# Patient Record
Sex: MALE | Race: WHITE | HISPANIC OR LATINO | Employment: OTHER | URBAN - METROPOLITAN AREA
[De-identification: names, ages, dates, MRNs, and addresses within clinical notes are randomized per-mention and may not be internally consistent; named-entity substitution may affect disease eponyms.]

---

## 2018-09-19 ENCOUNTER — TELEPHONE (OUTPATIENT)
Dept: UROLOGY | Facility: CLINIC | Age: 68
End: 2018-09-19

## 2018-09-19 NOTE — TELEPHONE ENCOUNTER
I called patient to reschedule 10/19/18 with Karan in [facility]  Patient was very upset stating he's been waiting since since August for an appt  Patient would like sooner appt in [place] only

## 2019-01-29 ENCOUNTER — TELEPHONE (OUTPATIENT)
Dept: NEPHROLOGY | Facility: CLINIC | Age: 69
End: 2019-01-29

## 2019-01-29 NOTE — TELEPHONE ENCOUNTER
Sonia Riley called from [facility] stating that the patient is Transferring his care from [facility] to [facility]  Sonia Riley was calling to see if the patient has called to set up an appointment with us to establish, but as of 1/29/2019 patient still has not called for an appointment  Sonia Riley said that the patient's voice mailbox is full and was not able to leave a message  Sonia Riley said that she will keep trying to call the patient to transfer over his care for follow up

## 2019-01-30 ENCOUNTER — HOSPITAL ENCOUNTER (INPATIENT)
Facility: HOSPITAL | Age: 69
LOS: 7 days | Discharge: HOME | DRG: 673 | End: 2019-02-07
Attending: EMERGENCY MEDICINE | Admitting: INTERNAL MEDICINE
Payer: COMMERCIAL

## 2019-01-30 DIAGNOSIS — N17.9 ACUTE RENAL FAILURE SUPERIMPOSED ON CHRONIC KIDNEY DISEASE (HCC): ICD-10-CM

## 2019-01-30 DIAGNOSIS — N18.9 ACUTE RENAL FAILURE SUPERIMPOSED ON CHRONIC KIDNEY DISEASE (HCC): ICD-10-CM

## 2019-01-30 DIAGNOSIS — J81.1 PULMONARY EDEMA: Primary | ICD-10-CM

## 2019-01-30 DIAGNOSIS — I50.9 ACUTE ON CHRONIC CONGESTIVE HEART FAILURE, UNSPECIFIED HEART FAILURE TYPE (HCC): ICD-10-CM

## 2019-01-30 PROCEDURE — 99285 EMERGENCY DEPT VISIT HI MDM: CPT

## 2019-01-30 PROCEDURE — 93005 ELECTROCARDIOGRAM TRACING: CPT

## 2019-01-31 ENCOUNTER — APPOINTMENT (EMERGENCY)
Dept: RADIOLOGY | Facility: HOSPITAL | Age: 69
DRG: 673 | End: 2019-01-31
Payer: COMMERCIAL

## 2019-01-31 ENCOUNTER — APPOINTMENT (INPATIENT)
Dept: NON INVASIVE DIAGNOSTICS | Facility: HOSPITAL | Age: 69
DRG: 673 | End: 2019-01-31
Payer: COMMERCIAL

## 2019-01-31 ENCOUNTER — APPOINTMENT (INPATIENT)
Dept: INTERVENTIONAL RADIOLOGY/VASCULAR | Facility: HOSPITAL | Age: 69
DRG: 673 | End: 2019-01-31
Payer: COMMERCIAL

## 2019-01-31 PROBLEM — E11.65 TYPE 2 DIABETES MELLITUS WITH HYPERGLYCEMIA (HCC): Status: ACTIVE | Noted: 2019-01-31

## 2019-01-31 PROBLEM — I50.9 CONGESTIVE HEART FAILURE (CHF) (HCC): Status: ACTIVE | Noted: 2019-01-31

## 2019-01-31 PROBLEM — E78.49 OTHER HYPERLIPIDEMIA: Status: ACTIVE | Noted: 2019-01-31

## 2019-01-31 PROBLEM — N18.9 ACUTE RENAL FAILURE SUPERIMPOSED ON CHRONIC KIDNEY DISEASE (HCC): Status: ACTIVE | Noted: 2019-01-31

## 2019-01-31 PROBLEM — N17.9 ACUTE RENAL FAILURE SUPERIMPOSED ON CHRONIC KIDNEY DISEASE (HCC): Status: ACTIVE | Noted: 2019-01-31

## 2019-01-31 PROBLEM — I10 ESSENTIAL HYPERTENSION: Status: ACTIVE | Noted: 2019-01-31

## 2019-01-31 LAB
25(OH)D3 SERPL-MCNC: 39.2 NG/ML (ref 30–100)
ALBUMIN SERPL BCP-MCNC: 3.2 G/DL (ref 3.5–5)
ALBUMIN SERPL BCP-MCNC: 3.5 G/DL (ref 3.5–5)
ALP SERPL-CCNC: 91 U/L (ref 46–116)
ALP SERPL-CCNC: 98 U/L (ref 46–116)
ALT SERPL W P-5'-P-CCNC: 10 U/L (ref 12–78)
ALT SERPL W P-5'-P-CCNC: 12 U/L (ref 12–78)
ANION GAP BLD CALC-SCNC: 15 MMOL/L (ref 4–13)
ANION GAP SERPL CALCULATED.3IONS-SCNC: 13 MMOL/L (ref 4–13)
ANION GAP SERPL CALCULATED.3IONS-SCNC: 14 MMOL/L (ref 4–13)
APTT PPP: 32 SECONDS (ref 26–38)
AST SERPL W P-5'-P-CCNC: 11 U/L (ref 5–45)
AST SERPL W P-5'-P-CCNC: 9 U/L (ref 5–45)
ATRIAL RATE: 116 BPM
BASOPHILS # BLD AUTO: 0.08 THOUSANDS/ΜL (ref 0–0.1)
BASOPHILS # BLD AUTO: 0.09 THOUSANDS/ΜL (ref 0–0.1)
BASOPHILS NFR BLD AUTO: 1 % (ref 0–1)
BASOPHILS NFR BLD AUTO: 1 % (ref 0–1)
BILIRUB SERPL-MCNC: 0.2 MG/DL (ref 0.2–1)
BILIRUB SERPL-MCNC: 0.3 MG/DL (ref 0.2–1)
BUN BLD-MCNC: 55 MG/DL (ref 5–25)
BUN SERPL-MCNC: 58 MG/DL (ref 5–25)
BUN SERPL-MCNC: 59 MG/DL (ref 5–25)
CA-I BLD-SCNC: 1.12 MMOL/L (ref 1.12–1.32)
CA-I BLD-SCNC: 1.13 MMOL/L (ref 1.12–1.32)
CALCIUM SERPL-MCNC: 8.3 MG/DL (ref 8.3–10.1)
CALCIUM SERPL-MCNC: 8.6 MG/DL (ref 8.3–10.1)
CHLORIDE BLD-SCNC: 107 MMOL/L (ref 100–108)
CHLORIDE SERPL-SCNC: 102 MMOL/L (ref 100–108)
CHLORIDE SERPL-SCNC: 103 MMOL/L (ref 100–108)
CO2 SERPL-SCNC: 22 MMOL/L (ref 21–32)
CO2 SERPL-SCNC: 22 MMOL/L (ref 21–32)
CREAT BLD-MCNC: 5.4 MG/DL (ref 0.6–1.3)
CREAT SERPL-MCNC: 5.5 MG/DL (ref 0.6–1.3)
CREAT SERPL-MCNC: 5.5 MG/DL (ref 0.6–1.3)
EOSINOPHIL # BLD AUTO: 0.6 THOUSAND/ΜL (ref 0–0.61)
EOSINOPHIL # BLD AUTO: 0.76 THOUSAND/ΜL (ref 0–0.61)
EOSINOPHIL NFR BLD AUTO: 5 % (ref 0–6)
EOSINOPHIL NFR BLD AUTO: 6 % (ref 0–6)
ERYTHROCYTE [DISTWIDTH] IN BLOOD BY AUTOMATED COUNT: 13.8 % (ref 11.6–15.1)
ERYTHROCYTE [DISTWIDTH] IN BLOOD BY AUTOMATED COUNT: 13.8 % (ref 11.6–15.1)
EST. AVERAGE GLUCOSE BLD GHB EST-MCNC: 200 MG/DL
GFR SERPL CREATININE-BSD FRML MDRD: 10 ML/MIN/1.73SQ M
GLUCOSE SERPL-MCNC: 137 MG/DL (ref 65–140)
GLUCOSE SERPL-MCNC: 283 MG/DL (ref 65–140)
GLUCOSE SERPL-MCNC: 300 MG/DL (ref 65–140)
GLUCOSE SERPL-MCNC: 301 MG/DL (ref 65–140)
GLUCOSE SERPL-MCNC: 368 MG/DL (ref 65–140)
GLUCOSE SERPL-MCNC: 370 MG/DL (ref 65–140)
HBA1C MFR BLD: 8.6 % (ref 4.2–6.3)
HCT VFR BLD AUTO: 33.5 % (ref 36.5–49.3)
HCT VFR BLD AUTO: 36.7 % (ref 36.5–49.3)
HCT VFR BLD CALC: 35 % (ref 36.5–49.3)
HGB BLD-MCNC: 10.4 G/DL (ref 12–17)
HGB BLD-MCNC: 11.4 G/DL (ref 12–17)
HGB BLDA-MCNC: 11.9 G/DL (ref 12–17)
IMM GRANULOCYTES # BLD AUTO: 0.06 THOUSAND/UL (ref 0–0.2)
IMM GRANULOCYTES # BLD AUTO: 0.09 THOUSAND/UL (ref 0–0.2)
IMM GRANULOCYTES NFR BLD AUTO: 1 % (ref 0–2)
IMM GRANULOCYTES NFR BLD AUTO: 1 % (ref 0–2)
INR PPP: 1.04 (ref 0.86–1.17)
INR PPP: 1.12 (ref 0.86–1.17)
LYMPHOCYTES # BLD AUTO: 2.12 THOUSANDS/ΜL (ref 0.6–4.47)
LYMPHOCYTES # BLD AUTO: 2.3 THOUSANDS/ΜL (ref 0.6–4.47)
LYMPHOCYTES NFR BLD AUTO: 17 % (ref 14–44)
LYMPHOCYTES NFR BLD AUTO: 19 % (ref 14–44)
MAGNESIUM SERPL-MCNC: 1.7 MG/DL (ref 1.6–2.6)
MCH RBC QN AUTO: 28 PG (ref 26.8–34.3)
MCH RBC QN AUTO: 28.3 PG (ref 26.8–34.3)
MCHC RBC AUTO-ENTMCNC: 31 G/DL (ref 31.4–37.4)
MCHC RBC AUTO-ENTMCNC: 31.1 G/DL (ref 31.4–37.4)
MCV RBC AUTO: 90 FL (ref 82–98)
MCV RBC AUTO: 91 FL (ref 82–98)
MONOCYTES # BLD AUTO: 0.86 THOUSAND/ΜL (ref 0.17–1.22)
MONOCYTES # BLD AUTO: 0.92 THOUSAND/ΜL (ref 0.17–1.22)
MONOCYTES NFR BLD AUTO: 7 % (ref 4–12)
MONOCYTES NFR BLD AUTO: 8 % (ref 4–12)
NEUTROPHILS # BLD AUTO: 7.82 THOUSANDS/ΜL (ref 1.85–7.62)
NEUTROPHILS # BLD AUTO: 8.48 THOUSANDS/ΜL (ref 1.85–7.62)
NEUTS SEG NFR BLD AUTO: 66 % (ref 43–75)
NEUTS SEG NFR BLD AUTO: 68 % (ref 43–75)
NRBC BLD AUTO-RTO: 0 /100 WBCS
NRBC BLD AUTO-RTO: 0 /100 WBCS
NT-PROBNP SERPL-MCNC: 805 PG/ML
P AXIS: 62 DEGREES
PCO2 BLD: 23 MMOL/L (ref 21–32)
PHOSPHATE SERPL-MCNC: 4.5 MG/DL (ref 2.3–4.1)
PHOSPHATE SERPL-MCNC: 4.8 MG/DL (ref 2.3–4.1)
PLATELET # BLD AUTO: 265 THOUSANDS/UL (ref 149–390)
PLATELET # BLD AUTO: 272 THOUSANDS/UL (ref 149–390)
PLATELET # BLD AUTO: 301 THOUSANDS/UL (ref 149–390)
PMV BLD AUTO: 10.5 FL (ref 8.9–12.7)
PMV BLD AUTO: 10.5 FL (ref 8.9–12.7)
PMV BLD AUTO: 10.6 FL (ref 8.9–12.7)
POTASSIUM BLD-SCNC: 4.6 MMOL/L (ref 3.5–5.3)
POTASSIUM SERPL-SCNC: 4.5 MMOL/L (ref 3.5–5.3)
POTASSIUM SERPL-SCNC: 4.7 MMOL/L (ref 3.5–5.3)
PR INTERVAL: 156 MS
PROT SERPL-MCNC: 7.3 G/DL (ref 6.4–8.2)
PROT SERPL-MCNC: 7.8 G/DL (ref 6.4–8.2)
PROTHROMBIN TIME: 13.6 SECONDS (ref 11.8–14.2)
PROTHROMBIN TIME: 14.3 SECONDS (ref 11.8–14.2)
PTH-INTACT SERPL-MCNC: 350.7 PG/ML (ref 18.4–80.1)
QRS AXIS: 38 DEGREES
QRSD INTERVAL: 72 MS
QT INTERVAL: 318 MS
QTC INTERVAL: 442 MS
RBC # BLD AUTO: 3.68 MILLION/UL (ref 3.88–5.62)
RBC # BLD AUTO: 4.07 MILLION/UL (ref 3.88–5.62)
SODIUM BLD-SCNC: 138 MMOL/L (ref 136–145)
SODIUM SERPL-SCNC: 138 MMOL/L (ref 136–145)
SODIUM SERPL-SCNC: 138 MMOL/L (ref 136–145)
SPECIMEN SOURCE: ABNORMAL
T WAVE AXIS: 56 DEGREES
TROPONIN I SERPL-MCNC: 0.03 NG/ML
TROPONIN I SERPL-MCNC: 0.14 NG/ML
TROPONIN I SERPL-MCNC: 0.2 NG/ML
TSH SERPL DL<=0.05 MIU/L-ACNC: 1.04 UIU/ML (ref 0.36–3.74)
VENTRICULAR RATE: 116 BPM
WBC # BLD AUTO: 11.92 THOUSAND/UL (ref 4.31–10.16)
WBC # BLD AUTO: 12.26 THOUSAND/UL (ref 4.31–10.16)

## 2019-01-31 PROCEDURE — 77001 FLUOROGUIDE FOR VEIN DEVICE: CPT | Performed by: RADIOLOGY

## 2019-01-31 PROCEDURE — 93010 ELECTROCARDIOGRAM REPORT: CPT | Performed by: INTERNAL MEDICINE

## 2019-01-31 PROCEDURE — 85025 COMPLETE CBC W/AUTO DIFF WBC: CPT | Performed by: PHYSICIAN ASSISTANT

## 2019-01-31 PROCEDURE — 87340 HEPATITIS B SURFACE AG IA: CPT | Performed by: INTERNAL MEDICINE

## 2019-01-31 PROCEDURE — 82330 ASSAY OF CALCIUM: CPT | Performed by: INTERNAL MEDICINE

## 2019-01-31 PROCEDURE — 86704 HEP B CORE ANTIBODY TOTAL: CPT | Performed by: INTERNAL MEDICINE

## 2019-01-31 PROCEDURE — 99152 MOD SED SAME PHYS/QHP 5/>YRS: CPT | Performed by: RADIOLOGY

## 2019-01-31 PROCEDURE — 85025 COMPLETE CBC W/AUTO DIFF WBC: CPT | Performed by: EMERGENCY MEDICINE

## 2019-01-31 PROCEDURE — 85014 HEMATOCRIT: CPT

## 2019-01-31 PROCEDURE — 99222 1ST HOSP IP/OBS MODERATE 55: CPT | Performed by: INTERNAL MEDICINE

## 2019-01-31 PROCEDURE — C1750 CATH, HEMODIALYSIS,LONG-TERM: HCPCS

## 2019-01-31 PROCEDURE — 86803 HEPATITIS C AB TEST: CPT | Performed by: INTERNAL MEDICINE

## 2019-01-31 PROCEDURE — 82306 VITAMIN D 25 HYDROXY: CPT | Performed by: INTERNAL MEDICINE

## 2019-01-31 PROCEDURE — 93306 TTE W/DOPPLER COMPLETE: CPT | Performed by: INTERNAL MEDICINE

## 2019-01-31 PROCEDURE — 83735 ASSAY OF MAGNESIUM: CPT | Performed by: PHYSICIAN ASSISTANT

## 2019-01-31 PROCEDURE — 02H633Z INSERTION OF INFUSION DEVICE INTO RIGHT ATRIUM, PERCUTANEOUS APPROACH: ICD-10-PCS | Performed by: RADIOLOGY

## 2019-01-31 PROCEDURE — 84484 ASSAY OF TROPONIN QUANT: CPT | Performed by: EMERGENCY MEDICINE

## 2019-01-31 PROCEDURE — 36558 INSERT TUNNELED CV CATH: CPT | Performed by: RADIOLOGY

## 2019-01-31 PROCEDURE — 84443 ASSAY THYROID STIM HORMONE: CPT | Performed by: EMERGENCY MEDICINE

## 2019-01-31 PROCEDURE — 93306 TTE W/DOPPLER COMPLETE: CPT

## 2019-01-31 PROCEDURE — 85730 THROMBOPLASTIN TIME PARTIAL: CPT | Performed by: PHYSICIAN ASSISTANT

## 2019-01-31 PROCEDURE — 83880 ASSAY OF NATRIURETIC PEPTIDE: CPT | Performed by: EMERGENCY MEDICINE

## 2019-01-31 PROCEDURE — 86706 HEP B SURFACE ANTIBODY: CPT | Performed by: INTERNAL MEDICINE

## 2019-01-31 PROCEDURE — 96374 THER/PROPH/DIAG INJ IV PUSH: CPT

## 2019-01-31 PROCEDURE — 80053 COMPREHEN METABOLIC PANEL: CPT | Performed by: EMERGENCY MEDICINE

## 2019-01-31 PROCEDURE — 82948 REAGENT STRIP/BLOOD GLUCOSE: CPT

## 2019-01-31 PROCEDURE — 85610 PROTHROMBIN TIME: CPT | Performed by: EMERGENCY MEDICINE

## 2019-01-31 PROCEDURE — 0JH63XZ INSERTION OF TUNNELED VASCULAR ACCESS DEVICE INTO CHEST SUBCUTANEOUS TISSUE AND FASCIA, PERCUTANEOUS APPROACH: ICD-10-PCS | Performed by: RADIOLOGY

## 2019-01-31 PROCEDURE — 85610 PROTHROMBIN TIME: CPT | Performed by: PHYSICIAN ASSISTANT

## 2019-01-31 PROCEDURE — 36415 COLL VENOUS BLD VENIPUNCTURE: CPT | Performed by: EMERGENCY MEDICINE

## 2019-01-31 PROCEDURE — 76937 US GUIDE VASCULAR ACCESS: CPT | Performed by: RADIOLOGY

## 2019-01-31 PROCEDURE — 84100 ASSAY OF PHOSPHORUS: CPT | Performed by: PHYSICIAN ASSISTANT

## 2019-01-31 PROCEDURE — 84100 ASSAY OF PHOSPHORUS: CPT | Performed by: INTERNAL MEDICINE

## 2019-01-31 PROCEDURE — 84484 ASSAY OF TROPONIN QUANT: CPT | Performed by: PHYSICIAN ASSISTANT

## 2019-01-31 PROCEDURE — 80047 BASIC METABLC PNL IONIZED CA: CPT

## 2019-01-31 PROCEDURE — 83036 HEMOGLOBIN GLYCOSYLATED A1C: CPT | Performed by: PHYSICIAN ASSISTANT

## 2019-01-31 PROCEDURE — 80053 COMPREHEN METABOLIC PANEL: CPT | Performed by: PHYSICIAN ASSISTANT

## 2019-01-31 PROCEDURE — 99152 MOD SED SAME PHYS/QHP 5/>YRS: CPT

## 2019-01-31 PROCEDURE — 99153 MOD SED SAME PHYS/QHP EA: CPT

## 2019-01-31 PROCEDURE — 76937 US GUIDE VASCULAR ACCESS: CPT

## 2019-01-31 PROCEDURE — 77001 FLUOROGUIDE FOR VEIN DEVICE: CPT

## 2019-01-31 PROCEDURE — 85049 AUTOMATED PLATELET COUNT: CPT | Performed by: PHYSICIAN ASSISTANT

## 2019-01-31 PROCEDURE — 71045 X-RAY EXAM CHEST 1 VIEW: CPT

## 2019-01-31 PROCEDURE — 83970 ASSAY OF PARATHORMONE: CPT | Performed by: INTERNAL MEDICINE

## 2019-01-31 PROCEDURE — 36558 INSERT TUNNELED CV CATH: CPT

## 2019-01-31 RX ORDER — ONDANSETRON 2 MG/ML
4 INJECTION INTRAMUSCULAR; INTRAVENOUS EVERY 8 HOURS PRN
Status: DISCONTINUED | OUTPATIENT
Start: 2019-01-31 | End: 2019-02-07 | Stop reason: HOSPADM

## 2019-01-31 RX ORDER — FUROSEMIDE 20 MG/1
20 TABLET ORAL DAILY
COMMUNITY
End: 2019-07-17 | Stop reason: SDUPTHER

## 2019-01-31 RX ORDER — CALCITRIOL 0.25 UG/1
0.25 CAPSULE, LIQUID FILLED ORAL DAILY
Status: DISCONTINUED | OUTPATIENT
Start: 2019-01-31 | End: 2019-02-01

## 2019-01-31 RX ORDER — CALCITRIOL 0.25 UG/1
0.25 CAPSULE, LIQUID FILLED ORAL DAILY
COMMUNITY

## 2019-01-31 RX ORDER — MIDAZOLAM HYDROCHLORIDE 1 MG/ML
INJECTION INTRAMUSCULAR; INTRAVENOUS CODE/TRAUMA/SEDATION MEDICATION
Status: COMPLETED | OUTPATIENT
Start: 2019-01-31 | End: 2019-01-31

## 2019-01-31 RX ORDER — VARENICLINE TARTRATE 1 MG/1
1 TABLET, FILM COATED ORAL 2 TIMES DAILY
COMMUNITY

## 2019-01-31 RX ORDER — HEPARIN SODIUM 5000 [USP'U]/ML
5000 INJECTION, SOLUTION INTRAVENOUS; SUBCUTANEOUS EVERY 8 HOURS SCHEDULED
Status: DISCONTINUED | OUTPATIENT
Start: 2019-01-31 | End: 2019-02-07 | Stop reason: HOSPADM

## 2019-01-31 RX ORDER — INSULIN GLARGINE 100 [IU]/ML
40 INJECTION, SOLUTION SUBCUTANEOUS
Status: DISCONTINUED | OUTPATIENT
Start: 2019-01-31 | End: 2019-02-07 | Stop reason: HOSPADM

## 2019-01-31 RX ORDER — FUROSEMIDE 10 MG/ML
80 INJECTION INTRAMUSCULAR; INTRAVENOUS
Status: DISCONTINUED | OUTPATIENT
Start: 2019-01-31 | End: 2019-02-04

## 2019-01-31 RX ORDER — FENTANYL CITRATE 50 UG/ML
INJECTION, SOLUTION INTRAMUSCULAR; INTRAVENOUS CODE/TRAUMA/SEDATION MEDICATION
Status: COMPLETED | OUTPATIENT
Start: 2019-01-31 | End: 2019-01-31

## 2019-01-31 RX ORDER — AMLODIPINE BESYLATE 10 MG/1
10 TABLET ORAL DAILY
Status: DISCONTINUED | OUTPATIENT
Start: 2019-01-31 | End: 2019-02-07 | Stop reason: HOSPADM

## 2019-01-31 RX ORDER — VARENICLINE TARTRATE 0.5 MG/1
1 TABLET, FILM COATED ORAL 2 TIMES DAILY
Status: DISCONTINUED | OUTPATIENT
Start: 2019-01-31 | End: 2019-02-07 | Stop reason: HOSPADM

## 2019-01-31 RX ORDER — AMLODIPINE BESYLATE 10 MG/1
10 TABLET ORAL DAILY
COMMUNITY
End: 2019-07-17 | Stop reason: SDUPTHER

## 2019-01-31 RX ORDER — FUROSEMIDE 20 MG/1
20 TABLET ORAL DAILY
Status: DISCONTINUED | OUTPATIENT
Start: 2019-01-31 | End: 2019-01-31

## 2019-01-31 RX ORDER — INSULIN GLARGINE 100 [IU]/ML
40 INJECTION, SOLUTION SUBCUTANEOUS
COMMUNITY

## 2019-01-31 RX ORDER — FUROSEMIDE 10 MG/ML
40 INJECTION INTRAMUSCULAR; INTRAVENOUS ONCE
Status: COMPLETED | OUTPATIENT
Start: 2019-01-31 | End: 2019-01-31

## 2019-01-31 RX ORDER — LIDOCAINE HYDROCHLORIDE AND EPINEPHRINE 10; 10 MG/ML; UG/ML
INJECTION, SOLUTION INFILTRATION; PERINEURAL CODE/TRAUMA/SEDATION MEDICATION
Status: COMPLETED | OUTPATIENT
Start: 2019-01-31 | End: 2019-01-31

## 2019-01-31 RX ADMIN — MIDAZOLAM HYDROCHLORIDE 1 MG: 1 INJECTION, SOLUTION INTRAMUSCULAR; INTRAVENOUS at 19:07

## 2019-01-31 RX ADMIN — INSULIN LISPRO 8 UNITS: 100 INJECTION, SOLUTION INTRAVENOUS; SUBCUTANEOUS at 21:45

## 2019-01-31 RX ADMIN — VARENICLINE TARTRATE 1 MG: 1 TABLET, FILM COATED ORAL at 08:26

## 2019-01-31 RX ADMIN — AMLODIPINE BESYLATE 10 MG: 10 TABLET ORAL at 08:26

## 2019-01-31 RX ADMIN — MIDAZOLAM HYDROCHLORIDE 1 MG: 1 INJECTION, SOLUTION INTRAMUSCULAR; INTRAVENOUS at 18:51

## 2019-01-31 RX ADMIN — LIDOCAINE HYDROCHLORIDE,EPINEPHRINE BITARTRATE 10 ML: 10; .01 INJECTION, SOLUTION INFILTRATION; PERINEURAL at 19:06

## 2019-01-31 RX ADMIN — FUROSEMIDE 20 MG: 20 TABLET ORAL at 08:26

## 2019-01-31 RX ADMIN — HEPARIN SODIUM 5000 UNITS: 5000 INJECTION, SOLUTION INTRAVENOUS; SUBCUTANEOUS at 21:44

## 2019-01-31 RX ADMIN — FENTANYL CITRATE 50 MCG: 50 INJECTION, SOLUTION INTRAMUSCULAR; INTRAVENOUS at 18:51

## 2019-01-31 RX ADMIN — LIDOCAINE HYDROCHLORIDE,EPINEPHRINE BITARTRATE 1 ML: 10; .01 INJECTION, SOLUTION INFILTRATION; PERINEURAL at 19:11

## 2019-01-31 RX ADMIN — FUROSEMIDE 80 MG: 10 INJECTION, SOLUTION INTRAMUSCULAR; INTRAVENOUS at 17:55

## 2019-01-31 RX ADMIN — FUROSEMIDE 80 MG: 10 INJECTION, SOLUTION INTRAMUSCULAR; INTRAVENOUS at 10:48

## 2019-01-31 RX ADMIN — INSULIN LISPRO 20 UNITS: 100 INJECTION, SOLUTION INTRAVENOUS; SUBCUTANEOUS at 08:26

## 2019-01-31 RX ADMIN — HEPARIN SODIUM 5000 UNITS: 5000 INJECTION, SOLUTION INTRAVENOUS; SUBCUTANEOUS at 06:00

## 2019-01-31 RX ADMIN — CALCIUM ACETATE 667 MG: 667 CAPSULE ORAL at 08:26

## 2019-01-31 RX ADMIN — FUROSEMIDE 40 MG: 10 INJECTION, SOLUTION INTRAVENOUS at 00:40

## 2019-01-31 RX ADMIN — CALCITRIOL CAPSULES 0.25 MCG 0.25 MCG: 0.25 CAPSULE ORAL at 08:25

## 2019-01-31 RX ADMIN — INSULIN LISPRO 6 UNITS: 100 INJECTION, SOLUTION INTRAVENOUS; SUBCUTANEOUS at 08:26

## 2019-01-31 RX ADMIN — INSULIN GLARGINE 40 UNITS: 100 INJECTION, SOLUTION SUBCUTANEOUS at 21:44

## 2019-01-31 RX ADMIN — LIDOCAINE HYDROCHLORIDE,EPINEPHRINE BITARTRATE 5 ML: 10; .01 INJECTION, SOLUTION INFILTRATION; PERINEURAL at 19:04

## 2019-01-31 NOTE — ASSESSMENT & PLAN NOTE
-presented to the emergency room with shortness of breath  -Creatinine at 5 50  -unclear what his baseline is no prior documentation available  -he does have a history of chronic kidney disease  unknown stage  -he reports that he was evaluated to start  dialysis  -he is on Lasix 20 mg p o   Daily  -he did received Lasix 40 mg in emergency room  -avoid nephrotoxic agents  -nephrology consult

## 2019-01-31 NOTE — CONSULTS
NEPHROLOGY CONSULTATION NOTE    Patient: Gio Martines               Sex: male          DOA: 1/30/2019 11:45 PM   YOB: 1950        Age:  70 y o         LOS:  LOS: 0 days     REFERRING PHYSICIAN: Dr Gillian Mendez / Morgan Trujillows:  CKD stage 5    DATE OF CONSULTATION / SERVICE: 1/31/2019    ADMISSION DIAGNOSIS: Acute renal failure superimposed on chronic kidney disease ([facility] [address] )     CHIEF COMPLAINT     Shortness of breath    HPI     This is a 59-year-old male with past medical history of CHF with diastolic dysfunction, hypertension, diabetes mellitus, obesity, renal cancer status post nephrectomy in 2002, chronic kidney disease stage 5 who presented to the ER with complains of shortness of breath  Patient was followed by [facility] specialist until recently  Since patient and family live near [place] dialysis unit they requested transfer to [address] [place] [address] assessed to be established with the [facility]  Patient was scheduled to see me next week however presented to the hospital complains of shortness of breath  On chest x-ray patient was noted to have vascular congestion  Patient was given diuretics with adequate response  Patient does have uremic symptoms including excessive sleepiness and fatigue  Patient had a AV fistula created 2 weeks ago for the very purpose of being used for dialysis           PAST MEDICAL HISTORY     Past Medical History:   Diagnosis Date    Cancer [facility])     kidney     [facility]congest[facility])     [facility]     Hyperlipidemia     Hypertension     Renal disorder        PAST SURGICAL HISTORY     Past Surgical History:   Procedure Laterality Date    NEPHRECTOMY Right     PLACEMENT PROTHESIS PENILE         ALLERGIES     No Known Allergies    SOCIAL HISTORY     History   Alcohol Use No     History   Drug Use No     History   Smoking Status    Former Smoker   Smokeless Tobacco    Never Used FAMILY HISTORY     History reviewed  No pertinent family history  CURRENT MEDICATIONS       Current Facility-Administered Medications:     amLODIPine (NORVASC) tablet 10 mg, 10 mg, Oral, Daily, Greg Fuchs PA-C, 10 mg at 01/31/19 0826    calcitriol (ROCALTROL) capsule 0 25 mcg, 0 25 mcg, Oral, Daily, Greg Fuchs PA-C, 0 25 mcg at 01/31/19 0825    calcium acetate (PHOSLO) capsule 667 mg, 667 mg, Oral, BID, Greg Fuchs PA-C, 667 mg at 01/31/19 0826    furosemide (LASIX) injection 80 mg, 80 mg, Intravenous, BID (diuretic), Jennifersa Fernando PA-C, 80 mg at 01/31/19 1048    heparin (porcine) subcutaneous injection 5,000 Units, 5,000 Units, Subcutaneous, Q8H [address], Stopped at 01/31/19 1400 **AND** Platelet count, , , Once, Greg Fuchs PA-C    insulin glargine (LANTUS) subcutaneous injection 40 Units 0 4 mL, 40 Units, Subcutaneous, HS, Greg Fuchs PA-C    insulin lispro (HumaLOG) 100 units/mL subcutaneous injection 2-12 Units, 2-12 Units, Subcutaneous, TID AC, 6 Units at 01/31/19 0826 **AND** Fingerstick Glucose (POCT), , , TID AC, Greg Fuchs PA-C    insulin lispro (HumaLOG) 100 units/mL subcutaneous injection 2-12 Units, 2-12 Units, Subcutaneous, HS, Greg Fuchs PA-C    insulin lispro (HumaLOG) 100 units/mL subcutaneous injection 20 Units, 20 Units, Subcutaneous, TID With Meals, Greg Fuchs PA-C, 20 Units at 01/31/19 0826    ondansetron (ZOFRAN) injection 4 mg, 4 mg, Intravenous, Q8H PRN, Greg Fuchs PA-C    varenicline (CHANTIX) tablet 1 mg, 1 mg, Oral, BID, Greg Fuchs PA-C, 1 mg at 01/31/19 4660    REVIEW OF SYSTEMS     Review of Systems   Constitutional: Positive for appetite change and fatigue  HENT: Negative  Eyes: Negative  Respiratory: Positive for shortness of breath  Cardiovascular: Positive for leg swelling  Gastrointestinal: Negative  Endocrine: Negative  Genitourinary: Negative  Musculoskeletal: Negative  Skin: Negative  Allergic/Immunologic: Negative  Neurological: Negative  Hematological: Negative  All other systems reviewed and are negative  OBJECTIVE     Current Weight: Weight - Scale: 114 kg (251 lb 5 2 oz)  Vitals:    01/31/19 1156   BP: 141/70   Pulse: 92   Resp: 20   Temp: 97 9 °F (36 6 °C)   SpO2: 96%     Body mass index is 39 36 kg/m²  Intake/Output Summary (Last 24 hours) at 01/31/19 1252  Last data filed at 01/31/19 3353   Gross per 24 hour   Intake              730 ml   Output             1380 ml   Net             -650 ml       PHYSICAL EXAMINATION     Physical Exam   Constitutional: He is oriented to person, place, and time  He appears well-developed and well-nourished  HENT:   Head: Normocephalic and atraumatic  Eyes: Pupils are equal, round, and reactive to light  Neck: Neck supple  Cardiovascular: Normal rate, regular rhythm and normal heart sounds  Pulmonary/Chest: Effort normal  He has rales  Abdominal: Soft  Bowel sounds are normal    Musculoskeletal: Normal range of motion  He exhibits edema  Neurological: He is alert and oriented to person, place, and time  Skin: Skin is warm  Psychiatric: He has a normal mood and affect           LAB RESULTS          Results from last 7 days  Lab Units 01/31/19  0557 01/31/19  0319 01/31/19  0010 01/31/19  0004   WBC Thousand/uL 12 26*  --   --  11 92*   HEMOGLOBIN g/dL 10 4*  --   --  11 4*   I STAT HEMOGLOBIN g/dl  --   --  11 9*  --    HEMATOCRIT % 33 5*  --   --  36 7   HEMATOCRIT, ISTAT %  --   --  35*  --    PLATELETS Thousands/uL 272 265  --  301   POTASSIUM mmol/L 4 7  --   --  4 5   CHLORIDE mmol/L 103  --   --  102   CO2 mmol/L 22  --   --  22   CO2, I-STAT mmol/L  --   --  23  --    BUN mg/dL 59*  --   --  58*   CREATININE mg/dL 5 50*  --   --  5 50*   EGFR ml/min/1 73sq m 10  --  10 10   CALCIUM mg/dL 8 3  --   --  8 6   MAGNESIUM mg/dL 1 7  --   --   --    PHOSPHORUS mg/dL 4 8*  --   --   --    GLUCOSE, ISTAT mg/dl  --   --  368*  --        I have personally reviewed the old medical records and patient's previously known baseline creatinine level is ~ 5 5    RADIOLOGY RESULTS     Results for orders placed during the hospital encounter of 01/30/19   XR chest 1 view portable    Narrative CHEST     INDICATION:   heart failure  COMPARISON:  None    EXAM PERFORMED/VIEWS:  XR CHEST PORTABLE      FINDINGS:    Cardiac silhouette is enlarged  Distended central pulmonary vasculature  No airspace consolidation, pneumothorax, or large pleural effusion  Osseous structures appear within normal limits for patient age  Impression Cardiomegaly and central pulmonary vascular congestion  Findings concur with the preliminary report by the referring clinician already in PACS and/or our electronic record EPIC  Workstation performed: [id number]       No results found for this or any previous visit  No results found for this or any previous visit  No results found for this or any previous visit  No results found for this or any previous visit  No results found for this or any previous visit  PLAN / RECOMMENDATIONS      21-year-old male with past medical history of nephrectomy 2nd to renal cancer in 2002, CKD stage 5, hypertension, diabetes mellitus type 2, obesity who presented with shortness of breath as found to be in volume overload  1  ESRD:  Patient has uremic symptoms including fatigue, excessive sleepiness and decreased appetite  X-ray finding of vascular congestion is likely related to progression of CKD as well  Patient and family are agreeable to start hemodialysis while in the hospital   Suspicion breakfast this morning, will have tunnel cuffed catheter placement later today and start renal replacement therapy from tomorrow  Pros and cons of dialysis told  Patient family are interested in home dialysis  Referral will be made as to the [facility]  Will obtain hepatitis profile as well      2  Shortness of breath:  Likely due to volume overload from progressive kidney disease  We will keep on Lasix 80 mg twice a day  3  Bone mineral disorder:  Will check PTH intact, 25 hydroxy vitamin-D, calcium and phosphorus levels  Patient is noted to be on calcitriol and calcium acetate as well  4  Anemia of chronic disease:  Hemoglobin is 10 4 in acceptable range will perform iron studies  5  Diabetes mellitus:  Patient is on standing Lantus as well as sliding scale  6  Access:  Patient has a left radiocephalic fistula which is not mature yet  Therefore will place a tunnel cuffed catheter in this patient  Thank you for the consultation to participate in patient's care  I have personally discussed my plan with the referring physician       Roger Adkins MD    1/31/2019

## 2019-01-31 NOTE — ED PROVIDER NOTES
History  Chief Complaint   Patient presents with    Shortness of Breath     SOB and CP for the past couple of days, pt is to begin dialysis but has not had tx yet      50-year-old male with known end-stage renal disease status post nephrectomy and kidney failure of his only remaining native kidney presents to the emergency department for evaluation of hypoxemia and work of breathing  The patient has been progressively short of breath for the past several days until today when he was in such distress he decided to come to the emergency department for evaluation  Currently the patient is working to breathe, speaking in 5-7 word sentences, is hypoxemic with O2 sat of 88% and no history of home oxygen  The patient did have a graft fistula placed to the left wrist which has a palpable thrill and is well-appearing for future use on dialysis  The patient has mild tenting of his T-waves in V2 three in for a Chem eight will be done to assess for hyperkalemia  The patient's underlying rhythm is a sinus induced tachycardia with ventricular rate of 116 on EKG reviewed by me at bedside at 2353 hours  There is no obvious ischemia there are frequent premature atrial contractions  Patient will be evaluated with a differential diagnosis to include but not be limited to worsening renal insufficiency and renal failure, congestive heart failure, hyperkalemia  History provided by:  Patient   used: No    Shortness of Breath   Severity:  Moderate  Onset quality:  Gradual  Timing:  Constant  Progression:  Worsening  Chronicity:  New  Context: activity    Relieved by:  Nothing  Worsened by: Activity, deep breathing, exertion and movement  Associated symptoms: no chest pain, no cough, no diaphoresis, no headaches, no neck pain, no rash and no vomiting    Risk factors: no hx of cancer        Prior to Admission Medications   Prescriptions Last Dose Informant Patient Reported? Taking?    amLODIPine (NORVASC) 10 mg tablet   Yes Yes   Sig: Take 10 mg by mouth daily   calcitriol (ROCALTROL) 0 25 mcg capsule   Yes Yes   Sig: Take 0 25 mcg by mouth daily   calcium acetate (PHOSLO) 667 mg capsule   Yes Yes   Sig: Take 667 mg by mouth 2 (two) times a day   furosemide (LASIX) 20 mg tablet   Yes Yes   Sig: Take 20 mg by mouth daily   insulin aspart (NovoLOG) 100 units/mL injection   Yes Yes   Sig: Inject 20 Units under the skin 3 (three) times a day before meals   insulin glargine (LANTUS) 100 units/mL subcutaneous injection   Yes Yes   Sig: Inject 40 Units under the skin daily at bedtime   varenicline (CHANTIX) 1 mg tablet   Yes Yes   Sig: Take 1 mg by mouth 2 (two) times a day      Facility-Administered Medications: None       Past Medical History:   Diagnosis Date    Cancer ([facility] )     kidney     [facility]congest[facility] failure) ([facility] )     Diabetes mellitus ([place] [address] )     Hyperlipidemia     Hypertension     Renal disorder        Past Surgical History:   Procedure Laterality Date    NEPHRECTOMY Right     PLACEMENT PROTHESIS PENILE         History reviewed  No pertinent family history  I have reviewed and agree with the history as documented  Social History   Substance Use Topics    Smoking status: Former Smoker    Smokeless tobacco: Never Used    Alcohol use No        Review of Systems   Constitutional: Negative for diaphoresis  Respiratory: Positive for shortness of breath  Negative for cough  Cardiovascular: Negative for chest pain  Gastrointestinal: Negative for vomiting  Musculoskeletal: Negative for neck pain  Skin: Negative for rash  Neurological: Negative for headaches  All other systems reviewed and are negative  Physical Exam  Physical Exam   Constitutional: He is oriented to person, place, and time  He appears well-developed and well-nourished  No distress  HENT:   Head: Normocephalic and atraumatic     Right Ear: External ear normal    Left Ear: External ear normal    Eyes: Conjunctivae and EOM are normal  Right eye exhibits no discharge  Left eye exhibits no discharge  No scleral icterus  Neck: Normal range of motion  Neck supple  No JVD present  No tracheal deviation present  No thyromegaly present  Cardiovascular: Normal rate and regular rhythm  Pulmonary/Chest: Effort normal and breath sounds normal  No stridor  No respiratory distress  He has no wheezes  He has no rales  Abdominal: Soft  Bowel sounds are normal  He exhibits no distension  There is no tenderness  Musculoskeletal: Normal range of motion  He exhibits no edema, tenderness or deformity  Neurological: He is alert and oriented to person, place, and time  No cranial nerve deficit  Coordination normal    Skin: Skin is warm and dry  He is not diaphoretic  Psychiatric: He has a normal mood and affect  His behavior is normal    Nursing note and vitals reviewed  Vital Signs  ED Triage Vitals [01/31/19 0009]   Temperature Pulse Respirations Blood Pressure SpO2   98 °F (36 7 °C) (!) 121 20 160/81 92 %      Temp Source Heart Rate Source Patient Position - Orthostatic VS BP Location FiO2 (%)   Oral Monitor Lying Right arm --      Pain Score       Worst Possible Pain           Vitals:    01/31/19 0009 01/31/19 0100   BP: 160/81 154/84   Pulse: (!) 121 (!) 117   Patient Position - Orthostatic VS: Lying Sitting       Visual Acuity      ED Medications  Medications   furosemide (LASIX) injection 40 mg (40 mg Intravenous Given 1/31/19 0040)       Diagnostic Studies  Results Reviewed     Procedure Component Value Units Date/Time    TSH, 3rd generation with Free T4 reflex [[id number]]  (Normal) Collected:  01/31/19 0004    Lab Status:  Final result Specimen:  Blood from Arm, Right Updated:  01/31/19 0033     TSH 3RD GENERATON 1 038 uIU/mL     Narrative:         Patients undergoing fluorescein dye angiography may retain small amounts of fluorescein in the body for 48-72 hours post procedure   Samples containing fluorescein can produce falsely depressed TSH values  If the patient had this procedure,a specimen should be resubmitted post fluorescein clearance  NT-BNP PRO (BNP for [place], [place], [place], [place], [place], [place], [place], [place] campuses) [[id number]]  (Abnormal) Collected:  01/31/19 0004    Lab Status:  Final result Specimen:  Blood from Arm, Right Updated:  01/31/19 0033     NT-proBNP 805 (H) pg/mL     Troponin I [[id number]]  (Normal) Collected:  01/31/19 0004    Lab Status:  Final result Specimen:  Blood from Arm, Right Updated:  01/31/19 0030     Troponin I 0 03 ng/mL     Comprehensive metabolic panel [[id number]]  (Abnormal) Collected:  01/31/19 0004    Lab Status:  Final result Specimen:  Blood from Arm, Right Updated:  01/31/19 0025     Sodium 138 mmol/L      Potassium 4 5 mmol/L      Chloride 102 mmol/L      CO2 22 mmol/L      ANION GAP 14 (H) mmol/L      BUN 58 (H) mg/dL      Creatinine 5 50 (H) mg/dL      Glucose 370 (H) mg/dL      Calcium 8 6 mg/dL      AST 11 U/L      ALT 12 U/L      Alkaline Phosphatase 98 U/L      Total Protein 7 8 g/dL      Albumin 3 5 g/dL      Total Bilirubin 0 20 mg/dL      eGFR 10 ml/min/1 73sq m     Narrative:         National Kidney Disease Education Program recommendations are as follows:  GFR calculation is accurate only with a steady state creatinine  Chronic Kidney disease less than 60 ml/min/1 73 sq  meters  Kidney failure less than 15 ml/min/1 73 sq  meters      Protime-INR [[id number]]  (Normal) Collected:  01/31/19 0004    Lab Status:  Final result Specimen:  Blood from Arm, Right Updated:  01/31/19 0020     Protime 13 6 seconds      INR 1 04    POCT Chem 8+ [[id number]]  (Abnormal) Collected:  01/31/19 0010    Lab Status:  Final result Updated:  01/31/19 0014     SODIUM, I-STAT 138 mmol/l      Potassium, i-STAT 4 6 mmol/L      Chloride, istat 107 mmol/L      CO2, i-STAT 23 mmol/L      Anion Gap, i-STAT 15 (H) mmol/L      Calcium, Ionized i-STAT 1 12 mmol/L      BUN, I-STAT 55 (H) mg/dl      Creatinine, i-STAT 5 4 (H) mg/dl eGFR 10 ml/min/1 73sq m      Glucose, i-STAT 368 (H) mg/dl      Hct, i-STAT 35 (L) %      Hgb, i-STAT 11 9 (L) g/dl      Specimen Type VENOUS    CBC and differential [[id number]]  (Abnormal) Collected:  01/31/19 0004    Lab Status:  Final result Specimen:  Blood from Arm, Right Updated:  01/31/19 0009     WBC 11 92 (H) Thousand/uL      RBC 4 07 Million/uL      Hemoglobin 11 4 (L) g/dL      Hematocrit 36 7 %      MCV 90 fL      MCH 28 0 pg      MCHC 31 1 (L) g/dL      RDW 13 8 %      MPV 10 5 fL      Platelets 439 Thousands/uL      nRBC 0 /100 WBCs      Neutrophils Relative 66 %      Immat GRANS % 1 %      Lymphocytes Relative 19 %      Monocytes Relative 7 %      Eosinophils Relative 6 %      Basophils Relative 1 %      Neutrophils Absolute 7 82 (H) Thousands/µL      Immature Grans Absolute 0 09 Thousand/uL      Lymphocytes Absolute 2 30 Thousands/µL      Monocytes Absolute 0 86 Thousand/µL      Eosinophils Absolute 0 76 (H) Thousand/µL      Basophils Absolute 0 09 Thousands/µL     Troponin I [[id number]]     Lab Status:  No result Specimen:  Blood                  XR chest 1 view portable   ED Interpretation by Dianne Garcia DO (01/31 0026)   Pulmonary edema                 Procedures  Procedures       Phone Contacts  ED Phone Contact    ED Course           Identification of Seniors at Risk      Most Recent Value   (ISAR) Identification of Seniors at Risk   Before the illness or injury that brought you to the Emergency, did you need someone to help you on a regular basis? 0 Filed at: 01/31/2019 0012   In the last 24 hours, have you needed more help than usual?  0 Filed at: 01/31/2019 2103   Have you been hospitalized for one or more nights during the past 6 months? 1 Filed at: 01/31/2019 0012   In general, do you see well?  0 Filed at: 01/31/2019 0012   In general, do you have serious problems with your memory? 0 Filed at: 01/31/2019 0012   Do you take more than three different medications every day?   1 Filed at: 01/31/2019 0012   ISAR Score  2 Filed at: 01/31/2019 0012                          [facility]  Number of Diagnoses or Management Options  Pulmonary edema: new and requires workup     Amount and/or Complexity of Data Reviewed  Clinical lab tests: reviewed and ordered  Tests in the radiology section of CPT®: ordered and reviewed  Decide to obtain previous medical records or to obtain history from someone other than the patient: yes  Review and summarize past medical records: yes    Patient Progress  Patient progress: stable      Disposition  Final diagnoses:   Pulmonary edema     Time reflects when diagnosis was documented in both MDM as applicable and the Disposition within this note     Time User Action Codes Description Comment    1/31/2019  1:04 AM Avery Chen Add [J81 1] Pulmonary edema       ED Disposition     ED Disposition Condition Date/Time Comment    Admit  Thu Jan 31, 2019  1:04 AM Case was discussed with Kj Raza and the patient's admission status was agreed to be Admission Status: inpatient status to the service of Dr Jose Garcia   Follow-up Information    None         Patient's Medications   Discharge Prescriptions    No medications on file     No discharge procedures on file      ED Provider  Electronically Signed by           Fawn Bay DO  01/31/19 0120

## 2019-01-31 NOTE — H&P
[facility] - [facility] [employer] Internal Medicine  H&P- Alycia Rivera 1950, 70 y o  male MRN: [id number]    Unit/Bed#: MS Aguirre-01 Encounter: [id number]    Primary Care Provider: Janene Ferreira MD   Date and time admitted to hospital: 1/30/2019 11:45 PM        * Acute renal failure superimposed on chronic kidney disease ([facility] )   Assessment & Plan    -presented to the emergency room with shortness of breath  -Creatinine at 5 50  -unclear what his baseline is no prior documentation available  -he does have a history of chronic kidney disease  unknown stage  -he reports that he was evaluated to start  dialysis  -he is on Lasix 20 mg p o  Daily  -he did received Lasix 40 mg in emergency room  -avoid nephrotoxic agents  -nephrology consult     Acute on chronic congestive heart failure ([facility] 75 )   Assessment & Plan    -Presented to the emergency room with shortness of breath , and chest pain Which he states is worse upon minimal exertion  -probably secondary to worsening CHF  -he has bilateral lower extremity edema  -chest x-ray completed official report pending  -proBNP at 805  -received Lasix 40 mg in the emergency room  -trend troponin x 3 sets  -AM labs  -check echo  -cardiology consult  -supportive care     Type 2 diabetes mellitus with hyperglycemia (HCC)   Assessment & Plan    Glucose level at 370  -continue Lantus 40 units subcu HS  -continue Humalog 20 units subcu t i d  With meals  -sliding scale insulin coverage  -fingerstick glucose 4 times daily AC/HS  -check A1C  -AM CMP     Essential hypertension   Assessment & Plan    -Blood pressure is stable  -continue home medication     Other hyperlipidemia   Assessment & Plan    -Not currently on medication regimen  -check lipid profile  -encouraged PCP follow-up             VTE Prophylaxis: Heparin  / sequential compression device   Code Status: Level 1- Full code  POLST: POLST form is not discussed and not completed at this time    Discussion with family: None    Anticipated Length of Stay:  Patient will be admitted on an Inpatient basis with an anticipated length of stay of  > 2 midnights  Justification for Hospital Stay:  Acute kidney injury, acute on chronic CHF,    Total Time for Visit, including Counseling / Coordination of Care: 30 minutes  Greater than 50% of this total time spent on direct patient counseling and coordination of care  Chief Complaint:   Shortness of breath    History of Present Illness:    Xochitl Terrazas is a 70 y o  male who presents with complaints of shortness of breath over the past several days getting progressively worse  Shortness of breath is associated with chest pain  He states that symptoms of worse with minimal exertion  He also states that he becomes very tired even with minimal exertion  He does have history of chronic kidney disease with possibility of beginning dialysis  He states that he has fistula placed but has not started dialysis  He also has history of congestive congestive heart failure  He denies fever, chills, nausea, diarrhea, vomiting, abdominal pain  Labs completed in emergency room with results as shown below  Chest x-ray completed with official report pending  He received Lasix 40 mg IV in emergency room  At bedside he states that he feels little bit better chest pain has subsided but he still feels a bit short of breath  Review of Systems:    Review of Systems   Constitutional: Positive for activity change and fatigue  Negative for chills and fever  HENT: Negative for sinus pain, sinus pressure, sore throat and trouble swallowing  Eyes: Negative for photophobia, redness and visual disturbance  Respiratory: Positive for chest tightness and shortness of breath  Negative for wheezing  Cardiovascular: Positive for chest pain and leg swelling  Negative for palpitations  Gastrointestinal: Negative for abdominal pain, diarrhea, nausea and vomiting  Endocrine: Negative for polydipsia, polyphagia and polyuria  Genitourinary: Negative for difficulty urinating, dysuria, flank pain and hematuria  Musculoskeletal: Negative for arthralgias, back pain, gait problem and joint swelling  Skin: Negative for color change, pallor and rash  Allergic/Immunologic: Negative for food allergies and immunocompromised state  Neurological: Negative for dizziness, tremors, facial asymmetry, weakness and light-headedness  Hematological: Does not bruise/bleed easily  Psychiatric/Behavioral: Negative for agitation, confusion and sleep disturbance  The patient is not nervous/anxious  Past Medical and Surgical History:     Past Medical History:   Diagnosis Date    Cancer ([facility] )     kidney     [facility]     Diabetes mellitus ([place] [address] )     Hyperlipidemia     Hypertension     Renal disorder        Past Surgical History:   Procedure Laterality Date    NEPHRECTOMY Right     PLACEMENT PROTHESIS PENILE         Meds/Allergies:    Prior to Admission medications    Medication Sig Start Date End Date Taking? Authorizing Provider   amLODIPine (NORVASC) 10 mg tablet Take 10 mg by mouth daily   Yes Historical Provider, MD   calcitriol (ROCALTROL) 0 25 mcg capsule Take 0 25 mcg by mouth daily   Yes Historical Provider, MD   calcium acetate (PHOSLO) 667 mg capsule Take 667 mg by mouth 2 (two) times a day   Yes Historical Provider, MD   furosemide (LASIX) 20 mg tablet Take 20 mg by mouth daily   Yes Historical Provider, MD   insulin aspart (NovoLOG) 100 units/mL injection Inject 20 Units under the skin 3 (three) times a day before meals   Yes Historical Provider, MD   insulin glargine (LANTUS) 100 units/mL subcutaneous injection Inject 40 Units under the skin daily at bedtime   Yes Historical Provider, MD   varenicline (CHANTIX) 1 mg tablet Take 1 mg by mouth 2 (two) times a day   Yes Historical Provider, MD     I have reviewed home medications with patient personally      Allergies: No Known Allergies    Social History:     Marital Status:    Occupation:  Retired  Patient Pre-hospital Living Situation:  Lives with roommate  Patient Pre-hospital Level of Mobility:  Active  Patient Pre-hospital Diet Restrictions:  None reported  Substance Use History:   History   Alcohol Use No     History   Smoking Status    Former Smoker   Smokeless Tobacco    Never Used     History   Drug Use No       Family History:    History reviewed  No pertinent family history  Physical Exam:     Vitals:   Blood Pressure: 147/65 (01/31/19 0153)  Pulse: 105 (01/31/19 0153)  Temperature: 98 3 °F (36 8 °C) (01/31/19 0153)  Temp Source: Oral (01/31/19 0153)  Respirations: 22 (01/31/19 0153)  Height: 5' 7" (170 2 cm) (01/31/19 0153)  Weight - Scale: 114 kg (252 lb 3 3 oz) (01/31/19 0153)  SpO2: 96 % (01/31/19 0153)    Physical Exam   Constitutional: He is oriented to person, place, and time  Mild respiratory distress   HENT:   Head: Normocephalic and atraumatic  Mouth/Throat: Oropharynx is clear and moist    Eyes: Pupils are equal, round, and reactive to light  EOM are normal  Right eye exhibits no discharge  Left eye exhibits discharge  Neck: Normal range of motion  Neck supple  No JVD present  Cardiovascular: Regular rhythm and intact distal pulses  Tachycardic   Pulmonary/Chest: No stridor  He is in respiratory distress  He has no wheezes  He has no rales  Abdominal: Soft  Bowel sounds are normal  He exhibits no distension  There is no tenderness  There is no rebound  Musculoskeletal: Normal range of motion  He exhibits edema  He exhibits no tenderness  Bilateral lower extremity edema   Neurological: He is oriented to person, place, and time  Skin: Skin is warm and dry  No rash noted  He is not diaphoretic  No erythema  No pallor  Vitals reviewed  Additional Data:     Lab Results: I have personally reviewed pertinent reports          Results from last 7 days  Lab Units 01/31/19  0319 01/31/19  0010 01/31/19  0004 WBC Thousand/uL  --   --  11 92*   HEMOGLOBIN g/dL  --   --  11 4*   I STAT HEMOGLOBIN g/dl  --  11 9*  --    HEMATOCRIT %  --   --  36 7   HEMATOCRIT, ISTAT %  --  35*  --    PLATELETS Thousands/uL 265  --  301   NEUTROS PCT %  --   --  66   LYMPHS PCT %  --   --  19   MONOS PCT %  --   --  7   EOS PCT %  --   --  6       Results from last 7 days  Lab Units 01/31/19  0010 01/31/19  0004   SODIUM mmol/L  --  138   POTASSIUM mmol/L  --  4 5   CHLORIDE mmol/L  --  102   CO2 mmol/L  --  22   CO2, I-STAT mmol/L 23  --    BUN mg/dL  --  58*   CREATININE mg/dL  --  5 50*   AGAP mmol/L 15*  --    ANION GAP mmol/L  --  14*   CALCIUM mg/dL  --  8 6   ALBUMIN g/dL  --  3 5   TOTAL BILIRUBIN mg/dL  --  0 20   ALK PHOS U/L  --  98   ALT U/L  --  12   AST U/L  --  11   GLUCOSE RANDOM mg/dL  --  370*       Results from last 7 days  Lab Units 01/31/19  0004   INR  1 04                   Imaging: I have personally reviewed pertinent reports  XR chest 1 view portable   ED Interpretation by Jaclyn Murdock DO (01/31 0026)   Pulmonary edema          EKG, Pathology, and Other Studies Reviewed on Admission:   · EKG: Sinus tachycardia at rate of 116 bpm    Allscripts / Epic Records Reviewed: Yes     ** Please Note: This note has been constructed using a voice recognition system   **

## 2019-01-31 NOTE — ASSESSMENT & PLAN NOTE
Glucose level at 370  -continue Lantus 40 units subcu HS  -continue Humalog 20 units subcu t i d   With meals  -sliding scale insulin coverage  -fingerstick glucose 4 times daily AC/HS  -check A1C  -AM CMP

## 2019-01-31 NOTE — CONSULTS
Consultation - Cardiology Team One  [place] Piero 70 y o  male MRN: [id number]  Unit/Bed#: -01 Encounter: [id number]    Inpatient consult to Cardiology  Consult performed by: Keith Hylton ordered by: Gabi Orr          Physician Requesting Consult: Ralf Richardson MD  Reason for Consult / Principal Problem: chf    HPI: Cardiologist Dr Solis Kena is a 70y o  year old male who has a history of kidney cancer status post nephrectomy, CHF, diabetes, hypertension, hyperlipidemia, CKD, aortic stenosis, mitral valve stenosis  Patient came to the emergency with complaints of shortness of breath for the last couple of days at progressively getting worse  Patient states he would get short of breath with minimal exertion  He states he was tired with even minimal exertion  He has no personal history of CAD  Patient has known chronic kidney disease and has a fistula placement has not started on dialysis, he thinks it is not very far away  Patient states he is feeling much better since being in the hospital   Patient's primary cardiologist is Dr Seymour Nation from [address] [place] [address] [address]         REVIEW OF SYSTEMS:  Constitutional:  Denies fever or chills   Eyes:  Denies change in visual acuity   HENT:  Denies nasal congestion or sore throat   Respiratory:  +shortness of breath  Cardiovascular:  Denies chest pain or edema   GI:  Denies abdominal pain, nausea, vomiting, bloody stools or diarrhea   :  Denies dysuria, frequency, difficulty in micturition and nocturia  Musculoskeletal:  Denies back pain or joint pain   Neurologic:  Denies headache, focal weakness or sensory changes   Endocrine:  Denies polyuria or polydipsia   Lymphatic:  Denies swollen glands   Psychiatric:  Denies depression or anxiety     Historical Information   Past Medical History:   Diagnosis Date    Cancer ([facility] )     kidney     CHF (congestive heart failure) ([facility] )     Diabetes mellitus ([facility] )     Hyperlipidemia     Hypertension     Renal disorder      Past Surgical History:   Procedure Laterality Date    NEPHRECTOMY Right     PLACEMENT PROTHESIS PENILE       History   Alcohol Use No     History   Drug Use No     History   Smoking Status    Former Smoker   Smokeless Tobacco    Never Used       Family History: History reviewed  No pertinent family history  MEDS & ALLERGIES:  all current active meds have been reviewed and current meds: Current Facility-Administered Medications   Medication Dose Route Frequency    amLODIPine (NORVASC) tablet 10 mg  10 mg Oral Daily    calcitriol (ROCALTROL) capsule 0 25 mcg  0 25 mcg Oral Daily    calcium acetate (PHOSLO) capsule 667 mg  667 mg Oral BID    furosemide (LASIX) injection 80 mg  80 mg Intravenous BID (diuretic)    heparin (porcine) subcutaneous injection 5,000 Units  5,000 Units Subcutaneous Q8H [facility]    insulin glargine (LANTUS) subcutaneous injection 40 Units 0 4 mL  40 Units Subcutaneous HS    insulin lispro (HumaLOG) 100 units/mL subcutaneous injection 2-12 Units  2-12 Units Subcutaneous TID AC    insulin lispro (HumaLOG) 100 units/mL subcutaneous injection 2-12 Units  2-12 Units Subcutaneous HS    insulin lispro (HumaLOG) 100 units/mL subcutaneous injection 20 Units  20 Units Subcutaneous TID With Meals    ondansetron (ZOFRAN) injection 4 mg  4 mg Intravenous Q8H PRN    varenicline (CHANTIX) tablet 1 mg  1 mg Oral BID        No Known Allergies    OBJECTIVE:  Vitals:   Vitals:    01/31/19 1156   BP: 141/70   Pulse: 92   Resp: 20   Temp: 97 9 °F (36 6 °C)   SpO2: 96%     Body mass index is 39 36 kg/m²      Systolic (12CDI), SAX:696 , Min:141 , IOI:606     Diastolic (35RVC), LII:17, Min:65, Max:84      Intake/Output Summary (Last 24 hours) at 01/31/19 1330  Last data filed at 01/31/19 0923   Gross per 24 hour   Intake              730 ml   Output             1380 ml   Net             -650 ml     Weight (last 2 days)     Date/Time   Weight    01/31/19 0557  114 (251 32)    01/31/19 0153  114 (252 21)    01/31/19 0009  116 (033 62)            Invasive Devices     Peripheral Intravenous Line            Peripheral IV 01/30/19 Right Antecubital less than 1 day                PHYSICAL EXAMS:  General:  Patient is not in acute distress, laying in the bed comfortably, awake, alert responding to commands  Head: Normocephalic, Atraumatic  HEENT: White sclera, pink conjunctiva,  PERRLA,pharynx benign  Neck:  Supple, no neck vein distention, carotids+2/+2 no bruits, thyromegaly, adenopathy  Respiratory: decreased breath sounds  Cardiovascular:  PMI normal, S1-S2 normal, No  Murmurs, thrills, gallops, rubs   Regular rhythm  GI:  Abdomen soft nontender   No hepatosplenomegaly, adenopathy, ascites,or rebound tenderness  Extremities: +1 edema, normal pulses, no calf tenderness, no joint deformities, no venous disease   Integument:  No skin rashes or ulceration  Lymphatic:  No cervical or inguinal lymphadenopathy  Neurologic:  Patient is awake alert, responding to command, well-oriented to time and place and person moving all extremities    LABORATORY RESULTS:    Results from last 7 days  Lab Units 01/31/19  0614 01/31/19  0319 01/31/19  0004   TROPONIN I ng/mL 0 20* 0 14* 0 03     CBC with diff:   Results from last 7 days  Lab Units 01/31/19  0557 01/31/19  0319 01/31/19  0010 01/31/19  0004   WBC Thousand/uL 12 26*  --   --  11 92*   HEMOGLOBIN g/dL 10 4*  --   --  11 4*   I STAT HEMOGLOBIN g/dl  --   --  11 9*  --    HEMATOCRIT % 33 5*  --   --  36 7   HEMATOCRIT, ISTAT %  --   --  35*  --    MCV fL 91  --   --  90   PLATELETS Thousands/uL 272 265  --  301   MCH pg 28 3  --   --  28 0   MCHC g/dL 31 0*  --   --  31 1*   RDW % 13 8  --   --  13 8   MPV fL 10 6 10 5  --  10 5   NRBC AUTO /100 WBCs 0  --   --  0       CMP:  Results from last 7 days  Lab Units 01/31/19  0557 01/31/19  0010 01/31/19  0004   POTASSIUM mmol/L 4 7  --  4 5   CHLORIDE mmol/L 103  --  102   CO2 mmol/L 22  --  22   CO2, I-STAT mmol/L  --  23 --    BUN mg/dL 59*  --  58*   CREATININE mg/dL 5 50*  --  5 50*   GLUCOSE, ISTAT mg/dl  --  368*  --    CALCIUM mg/dL 8 3  --  8 6   AST U/L 9  --  11   ALT U/L 10*  --  12   ALK PHOS U/L 91  --  98   EGFR ml/min/1 73sq m 10 10 10       BMP:  Results from last 7 days  Lab Units 19  0557 19  0010 19  0004   POTASSIUM mmol/L 4 7  --  4 5   CHLORIDE mmol/L 103  --  102   CO2 mmol/L 22  --  22   CO2, I-STAT mmol/L  --  23  --    BUN mg/dL 59*  --  58*   CREATININE mg/dL 5 50*  --  5 50*   GLUCOSE, ISTAT mg/dl  --  368*  --    CALCIUM mg/dL 8 3  --  8 6         Results from last 7 days  Lab Units 19  0004   NT-PRO BNP pg/mL 805*        Results from last 7 days  Lab Units 19  0557   MAGNESIUM mg/dL 1 7       Results from last 7 days  Lab Units 19  0557   HEMOGLOBIN A1C % 8 6*       Results from last 7 days  Lab Units 19  0004   TSH 3RD GENERATON uIU/mL 1 038       Results from last 7 days  Lab Units 19  0557 19  0004   INR  1 12 1 04       Lipid Profile:   No results found for: CHOL  No results found for: HDL  No results found for: LDLCALC  No results found for: TRIG    Cardiac testing:   Results for orders placed during the hospital encounter of 19   Echo complete with contrast if indicated    Narrative [address] [place] [address] [place] [id number] [phone]    Transthoracic Echocardiogram  2D, M-mode, Doppler, and Color Doppler    Study date:  2019    Patient: Francia Gamble  MR number: [id number]  Account number: [de-identified]  : 1950  Age: 70 years  Gender: Male  Status: Inpatient  Location: Bedside  Height: 67 in  Weight: 251 lb  BP: 153/ 72 mmHg    Indications: Shortness of breath      Diagnoses: R06 00 - Dyspnea, unspecified    Sonographer:  Denys Mart RDCS  Referring Physician:  Alondra Huertas MD  Group:  [facility]  Interpreting Physician:  Gloria Briceno, DO    IMPRESSIONS:  Normal left ventricular size and systolic function, ejection fraction 60%  Normal right ventricular size and systolic function  Biatrial dilation  Normal aorta  Pericardial fat pat with small, echo-light, free flowing effusion without tamponade  Moderate/severe MAC with moderately calcified mitral leaflets, moderate mitral stenosis (mean gradient 8-9 mmHg) and mild regurgitation  Severlely calcified aortic valve with moderate/severe aortic valve stenosis (3 8 m/s, mean gradient 32 mmHg)  No other significant valve abnormalities  Normal IVC diameter and inspiratory collapse  RA pressure estimated at 3 mmHg  No evidence of pulmonary hypertension  No prior studies available for comparison  SUMMARY    LEFT VENTRICLE:  Systolic function was normal by visual assessment  Ejection fraction was estimated to be 65 %  There were no regional wall motion abnormalities  LEFT ATRIUM:  The atrium was dilated  RIGHT ATRIUM:  The atrium was dilated  MITRAL VALVE:  There was moderate to marked annular calcification  There was moderate calcification  Transmitral velocity was increased due to valvular stenosis  There was moderate stenosis  There was mild regurgitation  Regurgitation grade was 1+ on a scale of 0 to 4+  AORTIC VALVE:  Leaflets exhibited markedly increased thickness, moderate to marked calcification, moderately reduced cuspal separation, and sclerosis  Transaortic velocity was increased due to valvular stenosis  There was moderate to severe stenosis  There was trace regurgitation  PERICARDIUM:  A small, free-flowing pericardial effusion was identified circumferential to the heart  The fluid had no internal echoes  There was no evidence of hemodynamic compromise  COMPARISONS:  No prior echocardiograms available for comparison  HISTORY: PRIOR HISTORY: hypertension, hyperlipidemia, CHF, CKD, SOB, fatigue    PROCEDURE: The procedure was performed at the bedside  This was a routine study  The transthoracic approach was used  The study included complete 2D imaging, M-mode, limited spectral Doppler, and color Doppler  The heart rate was 93 bpm,  at the start of the study  Images were obtained from the parasternal, apical, subcostal, and suprasternal notch acoustic windows  Image quality was adequate  LEFT VENTRICLE: Size was normal  Systolic function was normal by visual assessment  Ejection fraction was estimated to be 65 %  There were no regional wall motion abnormalities  DOPPLER: The study was not technically sufficient to allow  evaluation of LV diastolic function  RIGHT VENTRICLE: The size was normal  Systolic function was normal  DOPPLER: The tricuspid jet envelope definition was inadequate for estimation of RV systolic pressure  There are no indirect findings (abnormal RV volume or geometry,  altered pulmonary flow velocity profile, or leftward septal displacement) which would suggest moderate or severe pulmonary hypertension  LEFT ATRIUM: The atrium was dilated  RIGHT ATRIUM: The atrium was dilated  MITRAL VALVE: There was moderate to marked annular calcification  There was moderate calcification  There was mildly reduced leaflet separation  DOPPLER: Transmitral velocity was increased due to valvular stenosis  There was moderate  stenosis  There was mild regurgitation  Regurgitation grade was 1+ on a scale of 0 to 4+  Diastolic regurgitation was absent  AORTIC VALVE: Leaflets exhibited markedly increased thickness, moderate to marked calcification, moderately reduced cuspal separation, and sclerosis  DOPPLER: Transaortic velocity was increased due to valvular stenosis  There was moderate  to severe stenosis  There was trace regurgitation  Regurgitation grade was less than 1+ on a scale of 0 to 4+  TRICUSPID VALVE: The valve structure was normal  There was normal leaflet separation  DOPPLER: The transtricuspid velocity was within the normal range  There was no evidence for stenosis  There was no regurgitation  PULMONIC VALVE: Leaflets exhibited normal thickness, no calcification, and normal cuspal separation  DOPPLER: The transpulmonic velocity was within the normal range  There was no regurgitation  PERICARDIUM: A small, free-flowing pericardial effusion was identified circumferential to the heart  The fluid had no internal echoes  There was no evidence of hemodynamic compromise  A pericardial fat pad was present  AORTA: The root exhibited normal size  SYSTEMIC VEINS: IVC: The inferior vena cava was normal in size and course  Respirophasic changes were normal  RA pressure estimated at 3 mmHg  SYSTEM MEASUREMENT TABLES    2D  %FS: 34 %  Ao Diam: 3 6 cm  EDV(Teich): 145 8 ml  EF(Teich): 62 4 %  ESV(Teich): 54 8 ml  IVSd: 1 3 cm  LA Area: 20 7 cm2  LA Diam: 4 2 cm  LVEDV MOD A4C: 90 2 ml  LVEF MOD A4C: 68 9 %  LVESV MOD A4C: 28 ml  LVIDd: 5 5 cm  LVIDs: 3 6 cm  LVLd A4C: 8 6 cm  LVLs A4C: 7 cm  LVOT Diam: 2 3 cm  LVPWd: 1 3 cm  RA Area: 16 9 cm2  RVIDd: 3 5 cm  SV MOD A4C: 62 1 ml  SV(Teich): 91 ml    CW  AV Env  Ti: 304 5 ms  AV VTI: 81 7 cm  AV Vmax: 3 8 m/s  AV Vmean: 2 7 m/s  AV maxP 7 mmHg  AV meanP 3 mmHg    MM  TAPSE: 2 6 cm    PW  BRENDA (VTI): 1 2 cm2  BRENDA Vmax: 1 2 cm2  AVC: 288 5 ms  LVOT Env  Ti: 352 ms  LVOT VTI: 24 8 cm  LVOT Vmax: 1 1 m/s  LVOT Vmean: 0 7 m/s  LVOT maxP 9 mmHg  LVOT meanP 4 mmHg  LVSV Dopp: 98 7 ml  MV A Jose: 1 9 m/s  MV Dec [place]: 5 6 m/s2  MV DecT: 299 9 ms  MV E Jose: 1 7 m/s  MV E/A Ratio: 0 9  MV PHT: 87 ms  MVA By PHT: 2 5 cm2    [facility] [place] [facility] Accredited Echocardiography Laboratory    Prepared and electronically signed by    Josiah Rodriguez DO  Signed 2019 12:01:48       No results found for this or any previous visit  No procedure found  No results found for this or any previous visit  Imaging:   I have personally reviewed pertinent reports          EKG reviewed personally:  st    Telemetry reviewed personally:   sr    Assessment/Plan:  1-acute on chronic diastolic congestive heart failure; will add Lasix 80 b i d    Check echo to assess lvef and RWMA  Continue all other medications  Daily weights  Salt restriction  Strict I&Os  2-NSTEMI type 2 likely secondary to CHF exacerbation/CKD; troponin 0 03/0 14/0  20  Echo pending  Continue all meds    3-HTN, stable continue all meds    4-CKD, Cr today 5 5, mgmt per slim and renal    Code Status: Level 1 - Full Code    Counseling / Coordination of Care  Total floor / unit time spent today 35 minutes  Greater than 50% of total time was spent with the patient and / or family counseling and / or coordination of care  A description of the counseling / coordination of care: Review of history, current assessment, development of a plan      Isai Ewing PA-C  1/31/2019,1:30 PM

## 2019-01-31 NOTE — ASSESSMENT & PLAN NOTE
-Presented to the emergency room with shortness of breath , and chest pain Which he states is worse upon minimal exertion  -probably secondary to worsening CHF  -he has bilateral lower extremity edema  -chest x-ray completed official report pending  -proBNP at 805  -received Lasix 40 mg in the emergency room  -trend troponin x 3 sets  -AM labs  -check echo  -cardiology consult  -supportive care

## 2019-01-31 NOTE — CONSULTS
IR Consult Note    HPI:  70year old male with CKD admitted with shortness of breath was found to have RANGEL and will require dialysis catheter placement  He had an AVF created 2 weeks ago and is not ready for use  PMH:  Renal cancer  CHF  CKD  DM  HLD  HTN    PSH:  Right nephrectomy  Penile prosthesis    Physical exam:  Gen: NAD    Coags ok    A/P:  70year old male with CKD admitted with shortness of breath was found to have RANGEL  His AVF is not ready for use      - Tunneled dialysis catheter placement

## 2019-01-31 NOTE — UTILIZATION REVIEW
Initial Clinical Review    Admission: Date/Time/Statement: 1/31/19 @ 0104   Orders Placed This Encounter   Procedures    Inpatient Admission (expected length of stay for this patient Order details is greater than two midnights)     Standing Status:   Standing     Number of Occurrences:   1     Order Specific Question:   Admitting Physician     Answer:   Merritt Erwin Ranjit Bonilla     Order Specific Question:   Level of Care     Answer:   Med Surg [16]     Order Specific Question:   Estimated length of stay     Answer:   More than 2 Midnights     Order Specific Question:   Certification     Answer:   I certify that inpatient services are medically necessary for this patient for a duration of greater than two midnights  See H&P and MD Progress Notes for additional information about the patient's course of treatment  ED: Date/Time/Mode of Arrival:   ED Arrival Information     Expected Arrival Acuity Means of Arrival Escorted By Service Admission Type    - 1/30/2019 23:40 Emergent Wheelchair Family Member General Medicine Emergency    Arrival Complaint    chest pain        Chief Complaint:   Chief Complaint   Patient presents with    Shortness of Breath     SOB and CP for the past couple of days, pt is to begin dialysis but has not had tx yet      History of Illness: 71 yo male to ED w/ SOB over the past several months getting progressively worse   Hx of CKD , fistula placed but has not started dialysis   Per ED physician note     Currently the patient is working to breathe, speaking in 5-7 word sentences, is hypoxemic with O2 sat of 88% and no history of home oxygen    PE : mild resp distress, tachycardic , resp distress, edema BLE     ED Vital Signs:   ED Triage Vitals [01/31/19 0009]   Temperature Pulse Respirations Blood Pressure SpO2   98 °F (36 7 °C) (!) 121 20 160/81 92 %      Temp Source Heart Rate Source Patient Position - Orthostatic VS BP Location FiO2 (%)   Oral Monitor Lying Right arm --      Pain Score Worst Possible Pain        Wt Readings from Last 1 Encounters:   01/31/19 114 kg (251 lb 5 2 oz)     Vital Signs (abnormal):   01/31/19 0153  98 3 °F (36 8 °C)  105  22  147/65  9  96 %  Nasal cannula  Lying   01/31/19 0100  --   117   23  154/84  --  93 %  Nasal cannula  Sitting       Pertinent Labs/Diagnostic Test Results: trop  0 20, BNP  805, an gap  14, BUN creat   58 5 50, gluc  370, wbc  11 92, H&H   11 4  36 7  PCXR -      Cardiomegaly and central pulmonary vascular congestion  EKG mild tenting of his T-waves in V2 three   sinus induced tachycardia with ventricular rate of 116   ED Treatment:   Medication Administration from 01/30/2019 2340 to 01/31/2019 0152       Date/Time Order Dose Route Action Action by Comments     01/31/2019 0040 furosemide (LASIX) injection 40 mg 40 mg Intravenous Given Fei Montague RN         Past Medical/Surgical History: Active Ambulatory Problems     Diagnosis Date Noted    No Active Ambulatory Problems     Past Medical History:   Diagnosis Date    Cancer ([facility] )     CHF (congestive heart failure) ([facility] )     Diabetes mellitus ([facility] )     Hyperlipidemia     Hypertension     Renal disorder      Admitting Diagnosis: Shortness of breath [R06 02]  Pulmonary edema [J81 1]  Acute renal failure superimposed on chronic kidney disease ([facility] ) [N17 9, N18 9]  Age/Sex: 70 y o  male  Assessment/Plan: 69 yo male admitted w/ acute renal failure on CRF - creatine 5 50, unclear of baseline  Give lasix qd, avoid nephrotoxic agents , nephrology consult  / Acute on chronic CHF - BLE edema , BNP  805, serial trop , echo , cardiology consult      Admission Orders:  Scheduled Meds:   Current Facility-Administered Medications:  amLODIPine 10 mg Oral Daily    calcitriol 0 25 mcg Oral Daily    calcium acetate 667 mg Oral BID    furosemide 80 mg Intravenous BID (diuretic)    heparin (porcine) 5,000 Units Subcutaneous Q8H [facility]    insulin glargine 40 Units Subcutaneous HS    insulin lispro 2-12 Units Subcutaneous TID AC    insulin lispro 2-12 Units Subcutaneous HS    insulin lispro 20 Units Subcutaneous TID With Meals    ondansetron 4 mg Intravenous Q8H PRN    varenicline 1 mg Oral BID      Cardiology and nephrology consult   Fingerstick ac and hs   I&O   Daily weight   Up and OOB as peggy   OT PT eval   Tele   SCD  1/31  Bmp, cbc   Serial trop   #2  0 14  #3  0 20  1/31 BUN creat   59 5 50, gluc  300, alt   10, alb  3 2, hgb a1c 8 69

## 2019-02-01 ENCOUNTER — APPOINTMENT (INPATIENT)
Dept: DIALYSIS | Facility: HOSPITAL | Age: 69
DRG: 673 | End: 2019-02-01
Payer: COMMERCIAL

## 2019-02-01 LAB
ANION GAP SERPL CALCULATED.3IONS-SCNC: 9 MMOL/L (ref 4–13)
BASOPHILS # BLD AUTO: 0.05 THOUSANDS/ΜL (ref 0–0.1)
BASOPHILS NFR BLD AUTO: 0 % (ref 0–1)
BUN SERPL-MCNC: 66 MG/DL (ref 5–25)
CALCIUM SERPL-MCNC: 8.7 MG/DL (ref 8.3–10.1)
CHLORIDE SERPL-SCNC: 101 MMOL/L (ref 100–108)
CO2 SERPL-SCNC: 26 MMOL/L (ref 21–32)
CREAT SERPL-MCNC: 5.56 MG/DL (ref 0.6–1.3)
EOSINOPHIL # BLD AUTO: 0.57 THOUSAND/ΜL (ref 0–0.61)
EOSINOPHIL NFR BLD AUTO: 5 % (ref 0–6)
ERYTHROCYTE [DISTWIDTH] IN BLOOD BY AUTOMATED COUNT: 13.7 % (ref 11.6–15.1)
GFR SERPL CREATININE-BSD FRML MDRD: 10 ML/MIN/1.73SQ M
GLUCOSE SERPL-MCNC: 137 MG/DL (ref 65–140)
GLUCOSE SERPL-MCNC: 174 MG/DL (ref 65–140)
GLUCOSE SERPL-MCNC: 187 MG/DL (ref 65–140)
GLUCOSE SERPL-MCNC: 206 MG/DL (ref 65–140)
GLUCOSE SERPL-MCNC: 90 MG/DL (ref 65–140)
HBV CORE AB SER QL: NORMAL
HBV SURFACE AB SER-ACNC: <3.1 MIU/ML
HBV SURFACE AG SER QL: NORMAL
HCT VFR BLD AUTO: 34.4 % (ref 36.5–49.3)
HCV AB SER QL: NORMAL
HGB BLD-MCNC: 10.9 G/DL (ref 12–17)
IMM GRANULOCYTES # BLD AUTO: 0.05 THOUSAND/UL (ref 0–0.2)
IMM GRANULOCYTES NFR BLD AUTO: 0 % (ref 0–2)
LYMPHOCYTES # BLD AUTO: 2.13 THOUSANDS/ΜL (ref 0.6–4.47)
LYMPHOCYTES NFR BLD AUTO: 18 % (ref 14–44)
MCH RBC QN AUTO: 28.5 PG (ref 26.8–34.3)
MCHC RBC AUTO-ENTMCNC: 31.7 G/DL (ref 31.4–37.4)
MCV RBC AUTO: 90 FL (ref 82–98)
MONOCYTES # BLD AUTO: 0.84 THOUSAND/ΜL (ref 0.17–1.22)
MONOCYTES NFR BLD AUTO: 7 % (ref 4–12)
NEUTROPHILS # BLD AUTO: 8.23 THOUSANDS/ΜL (ref 1.85–7.62)
NEUTS SEG NFR BLD AUTO: 70 % (ref 43–75)
NRBC BLD AUTO-RTO: 0 /100 WBCS
PLATELET # BLD AUTO: 282 THOUSANDS/UL (ref 149–390)
PMV BLD AUTO: 10.6 FL (ref 8.9–12.7)
POTASSIUM SERPL-SCNC: 4.2 MMOL/L (ref 3.5–5.3)
RBC # BLD AUTO: 3.82 MILLION/UL (ref 3.88–5.62)
SODIUM SERPL-SCNC: 136 MMOL/L (ref 136–145)
WBC # BLD AUTO: 11.87 THOUSAND/UL (ref 4.31–10.16)

## 2019-02-01 PROCEDURE — 80048 BASIC METABOLIC PNL TOTAL CA: CPT | Performed by: INTERNAL MEDICINE

## 2019-02-01 PROCEDURE — 97163 PT EVAL HIGH COMPLEX 45 MIN: CPT

## 2019-02-01 PROCEDURE — 85025 COMPLETE CBC W/AUTO DIFF WBC: CPT | Performed by: INTERNAL MEDICINE

## 2019-02-01 PROCEDURE — 82948 REAGENT STRIP/BLOOD GLUCOSE: CPT

## 2019-02-01 PROCEDURE — 99232 SBSQ HOSP IP/OBS MODERATE 35: CPT | Performed by: INTERNAL MEDICINE

## 2019-02-01 PROCEDURE — G8979 MOBILITY GOAL STATUS: HCPCS

## 2019-02-01 PROCEDURE — 99233 SBSQ HOSP IP/OBS HIGH 50: CPT | Performed by: INTERNAL MEDICINE

## 2019-02-01 PROCEDURE — G8978 MOBILITY CURRENT STATUS: HCPCS

## 2019-02-01 RX ORDER — CHOLECALCIFEROL (VITAMIN D3) 10 MCG
1 TABLET ORAL
Status: DISCONTINUED | OUTPATIENT
Start: 2019-02-01 | End: 2019-02-07 | Stop reason: HOSPADM

## 2019-02-01 RX ORDER — DOXERCALCIFEROL 2 UG/ML
2 INJECTION, SOLUTION INTRAVENOUS 3 TIMES WEEKLY
Status: DISCONTINUED | OUTPATIENT
Start: 2019-02-01 | End: 2019-02-03

## 2019-02-01 RX ADMIN — INSULIN LISPRO 20 UNITS: 100 INJECTION, SOLUTION INTRAVENOUS; SUBCUTANEOUS at 18:14

## 2019-02-01 RX ADMIN — VARENICLINE TARTRATE 1 MG: 1 TABLET, FILM COATED ORAL at 09:33

## 2019-02-01 RX ADMIN — INSULIN LISPRO 4 UNITS: 100 INJECTION, SOLUTION INTRAVENOUS; SUBCUTANEOUS at 07:39

## 2019-02-01 RX ADMIN — CALCIUM ACETATE 667 MG: 667 CAPSULE ORAL at 18:12

## 2019-02-01 RX ADMIN — CALCIUM ACETATE 667 MG: 667 CAPSULE ORAL at 12:12

## 2019-02-01 RX ADMIN — HEPARIN SODIUM 5000 UNITS: 5000 INJECTION, SOLUTION INTRAVENOUS; SUBCUTANEOUS at 21:57

## 2019-02-01 RX ADMIN — FUROSEMIDE 80 MG: 10 INJECTION, SOLUTION INTRAMUSCULAR; INTRAVENOUS at 17:37

## 2019-02-01 RX ADMIN — CALCIUM ACETATE 667 MG: 667 CAPSULE ORAL at 07:40

## 2019-02-01 RX ADMIN — INSULIN LISPRO 20 UNITS: 100 INJECTION, SOLUTION INTRAVENOUS; SUBCUTANEOUS at 07:39

## 2019-02-01 RX ADMIN — Medication 1 CAPSULE: at 18:12

## 2019-02-01 RX ADMIN — EPOETIN ALFA 10000 UNITS: 10000 SOLUTION INTRAVENOUS; SUBCUTANEOUS at 15:57

## 2019-02-01 RX ADMIN — HEPARIN SODIUM 5000 UNITS: 5000 INJECTION, SOLUTION INTRAVENOUS; SUBCUTANEOUS at 17:37

## 2019-02-01 RX ADMIN — CALCITRIOL CAPSULES 0.25 MCG 0.25 MCG: 0.25 CAPSULE ORAL at 09:33

## 2019-02-01 RX ADMIN — INSULIN LISPRO 20 UNITS: 100 INJECTION, SOLUTION INTRAVENOUS; SUBCUTANEOUS at 12:08

## 2019-02-01 RX ADMIN — DOXERCALCIFEROL 2 MCG: 4 INJECTION, SOLUTION INTRAVENOUS at 15:54

## 2019-02-01 RX ADMIN — INSULIN GLARGINE 20 UNITS: 100 INJECTION, SOLUTION SUBCUTANEOUS at 21:56

## 2019-02-01 RX ADMIN — VARENICLINE TARTRATE 1 MG: 1 TABLET, FILM COATED ORAL at 18:12

## 2019-02-01 RX ADMIN — HEPARIN SODIUM 5000 UNITS: 5000 INJECTION, SOLUTION INTRAVENOUS; SUBCUTANEOUS at 05:43

## 2019-02-01 NOTE — DISCHARGE INSTRUCTIONS
Perma-cath Placement   WHAT YOU NEED TO KNOW:   A perma-cath is a catheter placed through a vein into or near your right atrium  Your right atrium is the right upper chamber of your heart  A perma-cath is used for dialysis in an emergency or until a long-term device is ready to use  After your procedure, you will have some pain and swelling on your chest and neck  You may have some bruises on your chest and neck  You may also have 2 dressings, one on your chest and one on your neck  DISCHARGE INSTRUCTIONS:   Call 911 for any of the following:   · You feel lightheaded, short of breath, and have chest pain  · Your catheter comes out   Contact Interventional Radiology at [phone] [place] PATIENTS: Contact Interventional Radiology at [phone]) Erin Neil PATIENTS: Contact Interventional Radiology at [phone]) if:  · Blood soaks through your bandage  · You have new swelling in your arm, neck, face, or chest on your right side  · Your catheter gets wet  · Your bruises or pain get worse  · You have a fever or chills  · Persistent nausea or vomiting  · Your incision is red, swollen, or draining pus  · You have questions or concerns about your condition or care  Self-care:       · Resume your normal diet  · Keep your dressings dry  Do not take a shower or swim  You may take a tub bath, but do not get your dressings wet  Water in your wound can cause bacteria to grow and cause an infection  If your dressing gets wet, dry it off and cover it with dry sterile gauze  Call your healthcare provider  Do not use soaps or ointments  · Do not change your dressings  Your healthcare provider or dialysis nurse will change your dressings  Your dressings should stay in place until your healthcare provider removes them  The dressing on your chest will stay as long as you have the catheter in place  The dressing prevents infection  · Do not remove the red and blue caps from the end of your catheter   The caps prevent air from getting into your catheter    Follow up with your healthcare provider as directed: Write down your questions so you remember to ask them during your visits

## 2019-02-01 NOTE — HEMODIALYSIS
First treatment for new start completed  Very good performance of catheter with Flow rates  Pt had significant episode of cramping in L  Calf area, 300 NSS given with effect  Goal adjusted  Pt completed treatment with UF of 1 Kg       Returned to room in wheelchair, alert and verbal

## 2019-02-01 NOTE — PHYSICAL THERAPY NOTE
Physical Therapy Evaluation     Patient's Name: Shanelle Abebe    Admitting Diagnosis  Shortness of breath [R06 02]  Pulmonary edema [J81 1]  Acute renal failure superimposed on chronic kidney disease ([facility] ) [N17 9, N18 9]    Problem List  Patient Active Problem List   Diagnosis    Other hyperlipidemia    Type 2 diabetes mellitus with hyperglycemia ([facility] )    Essential hypertension    Acute renal failure superimposed on chronic kidney disease ([facility] )    Acute on chronic congestive heart failure ([facility] )       Past Medical History  Past Medical History:   Diagnosis Date    Cancer ([facility] )     kidney     CHF (congestive heart failure) ([facility] )     Diabetes mellitus ([facility] )     Hyperlipidemia     Hypertension     Renal disorder        Past Surgical History  Past Surgical History:   Procedure Laterality Date    IR [facility] PLACEMENT  1/31/2019    NEPHRECTOMY Right     PLACEMENT PROTHESIS PENILE        02/01/19 1250   Note Type   Note type Eval only   Pain Assessment   Pain Assessment No/denies pain   Pain Score No Pain   Home Living   Type of [address] Two level;Stairs to enter with rails; Performs ADLs on one level; Able to live on main level with bedroom/bathroom  (1st floor set up, 0 DENITA)   Bathroom Shower/Tub Tub/shower unit   [employer] Raised   Bathroom Equipment Grab bars in shower; Shower chair   [address] [place];Crutches  (no AD used at baseline)   Additional Comments no home O2 at baseline   Prior Function   Level of [place] Independent with ADLs and functional mobility   Lives With Alone   Receives Help From Family;Friend(s)  (niece lives upstairs (roommate))   ADL Assistance Independent   IADLs Independent   Falls in the last 6 months 0  ((+) fall history)   Vocational Retired   Comments (+) driving   Restrictions/Precautions   [facility] Bearing Precautions Per Order No   Braces or Orthoses (none per pt)   Other Precautions Chair Alarm;Telemetry; Fall Risk;Limb alert;O2   General   Additional Pertinent History 1/31/19 CXR "Cardiomegaly and central pulmonary vascular congestion "   Family/Caregiver Present No   Cognition   Overall Cognitive Status WFL   Arousal/Participation Alert   Orientation Level Oriented X4   Memory Within functional limits   Following Commands Follows all commands and directions without difficulty   Comments pt agreeable to PT evaluation   RUE Assessment   RUE Assessment WFL  (AROM WFL)   LUE Assessment   LUE Assessment WFL  (AROM WFL)   RLE Assessment   RLE Assessment WFL  (grossly 3+/5 assessed c functional mobility)   LLE Assessment   LLE Assessment WFL  (grossly 3+/5 assessed c functional mobility)   Coordination   Movements are Fluid and Coordinated 1   Sensation WFL   Light Touch   RLE Light Touch Grossly intact   LLE Light Touch Grossly intact   Bed Mobility   Additional Comments pt received OOB in recliner upon arrival   Transfers   Sit to Stand 5  Supervision   Additional items Assist x 1; Armrests; Increased time required;Verbal cues   Stand to Sit 5  Supervision   Additional items Assist x 1; Armrests; Increased time required;Verbal cues   Additional Comments pt denies any lightheadedness/dizziness c transitions/transfers   Ambulation/Elevation   Gait pattern Short stride; Step to   Gait Assistance 5  Supervision   Additional items Assist x 1;Verbal cues   Assistive Device None   Distance 10' x2, did not test for distance   Stair Management Assistance Not tested   Balance   Static Sitting Good   Dynamic Sitting Fair +   Static Standing Fair   Dynamic Standing [place] 0526 -   Endurance Deficit   Endurance Deficit Yes   Activity Tolerance   Activity Tolerance Patient limited by fatigue   Medical Staff Made Aware MILES Lowery   Nurse Made Aware JOSELITO Hillman verbalized pt appropriate to see, made aware of session outcome/recs   Assessment   Prognosis Good   Problem List Decreased strength;Decreased endurance; Impaired balance;Decreased mobility; Decreased safety awareness   Assessment Pt is 70 y o  male seen for PT evaluation on 2/1/2019 s/p admit to [facility] on 1/30/2019 w/ Acute renal failure superimposed on chronic kidney disease ([facility] )  Pt presents c SOB c progressive worsening over past several days PTA  PT consulted to assess pt's functional mobility and d/c needs  Order placed for PT eval and tx, w/ up and OOB as tolerated order  Performed at least 2 patient identifiers during session: Name and wristband  Comorbidities affecting pt's physical performance at time of assessment include: kidney CA, CHF, DM, HLD, HTN, renal disorder  PTA, pt was independent w/ all functional mobility w/ no AD/DME, ambulates unrestricted distances and all terrain, has 0 DENITA and lives w/ 2 roommates (upstairs) in 2 level home (pt downstairs)  Personal factors affecting pt at time of IE include: inability to navigate community distances, limited home support and positive fall history  Please find objective findings from PT assessment regarding body systems outlined above with impairments and limitations including weakness, impaired balance, decreased endurance, gait deviations, decreased activity tolerance and fall risk, as well as mobility assessment (need for SBA assist w/ all phases of mobility when usually ambulating independently and need for cueing for mobility technique)  The following objective measures performed on IE also reveal limitations: Barthel Index: 55/100 and Modified [place]: 3 (moderate disability)  Pt's clinical presentation is currently unstable/unpredictable seen in pt's presentation of need for input for task focus and mobility technique, on telemetry monitoring and ongoing medical assessment  Pt to benefit from continued PT tx to address deficits as defined above and maximize level of functional independent mobility and consistency   From PT/mobility standpoint, recommendation at time of d/c would be anticipated Home PT pending progress in order to facilitate return to PLOF  Barriers to Discharge Decreased caregiver support   Goals   Patient Goals to return home   STG Expiration Date 02/11/19   Short Term Goal #1 In 7-10 days: Increase bilateral LE strength 1/2 grade to facilitate independent mobility, Perform all bed mobility tasks modified independent to decrease caregiver burden, Perform all transfers modified independent to improve independence, Ambulate > 150 ft  with least restrictive assistive device modified independent w/o LOB and w/ normalized gait pattern 100% of the time, Increase all balance 1/2 grade to decrease risk for falls, Complete exercise program independently, Tolerate 4 hr OOB to faciliate upright tolerance, Improve Barthel Index score to 70 or greater to facilitate independence and PT provider will perform functional balance assessment to determine fall risk   Treatment Day 0   Plan   Treatment/Interventions Functional transfer training;LE strengthening/ROM; Elevations; Therapeutic exercise; Endurance training;Patient/family training;Equipment eval/education; Bed mobility;Gait training;Spoke to nursing;Spoke to case management   PT Frequency (3-5x/wk)   Recommendation   Recommendation Home PT;OT consult;Home with family support  (anticipated pending progress)   Equipment Recommended (none anticipated)   PT - OK to Discharge No   Modified [place] Scale   Modified [place] Scale 3   Barthel Index   Feeding 10   Bathing 0   Grooming Score 5   Dressing Score 5   Bladder Score 10   Bowels Score 10   Toilet Use Score 5   Transfers (Bed/Chair) Score 10   Mobility (Level Surface) Score 0   Stairs Score 0   Barthel Index Score 55           Rory Sneed, PT, DPT

## 2019-02-01 NOTE — PROGRESS NOTES
[facility] Internal Medicine Progress Note  Patient: Luis Antonio Branch 70 y o  male   MRN: [id number]  PCP: Collins Hawkins MD  Unit/Bed#: -[place] Encounter: [id number]  Date Of Visit: 19    Assessment:    Principal Problem:    Acute renal failure superimposed on [facility]  Active Problems:    Other hyperlipidemia    Type 2 diabetes mellitus with hyperglycemia ([facility] )    Essential hypertension    Acute on chronic congestive heart failure ([facility] )      Plan:    · 1  Acute on chronic renal failure- nephrology following  Patient has temporary HD catheter and for HD today  · 2  SOB- mostlikely secondary to #1  Cardiology following  Patient 2d echo EF 65%  · 3  History of left renal cell carcinoma s/p nephrectomy   · 4  DM- on lantus and ISS  · 5  Smoking- on chantix  ·        VTE Pharmacologic Prophylaxis:   Pharmacologic: Heparin  Mechanical VTE Prophylaxis in Place: Yes    Patient Centered Rounds: I have performed bedside rounds with nursing staff today  Discussions with Specialists or Other Care Team Provider:     Education and Discussions with Family / Patient:     Time Spent for Care: 20 minutes  More than 50% of total time spent on counseling and coordination of care as described above  Current Length of Stay: 1 day(s)    Current Patient Status: Inpatient   Certification Statement: The patient will continue to require additional inpatient hospital stay due to ESRD on HD    Discharge Plan / Estimated Discharge Date: once above issues resolve      Code Status: Level 1 - Full Code      Subjective:   Patient seen and examined at bedside  Patient has no new complaints  Objective:     Vitals:   Temp (24hrs), Av 1 °F (36 7 °C), Min:97 5 °F (36 4 °C), Max:98 6 °F (37 °C)    Temp:  [97 5 °F (36 4 °C)-98 6 °F (37 °C)] 98 1 °F (36 7 °C)  HR:  [] 103  Resp:  [14-18] 18  BP: (107-176)/(58-86) 129/80  SpO2:  [92 %-99 %] 92 %  Body mass index is 39 54 kg/m²       Input and Output Summary (last 24 hours): Intake/Output Summary (Last 24 hours) at 02/01/19 1254  Last data filed at 02/01/19 0743   Gross per 24 hour   Intake              250 ml   Output             2575 ml   Net            -2325 ml       Physical Exam:     Physical Exam   Constitutional: He is oriented to person, place, and time  He appears well-developed and well-nourished  HENT:   Head: Normocephalic and atraumatic  Eyes: Pupils are equal, round, and reactive to light  Conjunctivae and EOM are normal    Neck: Normal range of motion  Neck supple  No JVD present  No tracheal deviation present  No thyromegaly present  Cardiovascular: Normal rate, regular rhythm and normal heart sounds  Exam reveals no gallop and no friction rub  No murmur heard  Pulmonary/Chest: Effort normal and breath sounds normal  No respiratory distress  He has no wheezes  He has no rales  Abdominal: Soft  Bowel sounds are normal  He exhibits no distension  There is no tenderness  There is no rebound  Musculoskeletal: He exhibits edema  Neurological: He is alert and oriented to person, place, and time  Skin: Skin is warm and dry  No rash noted  No erythema  Additional Data:     Labs:      Results from last 7 days  Lab Units 01/31/19  0557   WBC Thousand/uL 12 26*   HEMOGLOBIN g/dL 10 4*   HEMATOCRIT % 33 5*   PLATELETS Thousands/uL 272   NEUTROS PCT % 68   LYMPHS PCT % 17   MONOS PCT % 8   EOS PCT % 5       Results from last 7 days  Lab Units 01/31/19  0557 01/31/19  0010   POTASSIUM mmol/L 4 7  --    CHLORIDE mmol/L 103  --    CO2 mmol/L 22  --    CO2, I-STAT mmol/L  --  23   BUN mg/dL 59*  --    CREATININE mg/dL 5 50*  --    CALCIUM mg/dL 8 3  --    ALK PHOS U/L 91  --    ALT U/L 10*  --    AST U/L 9  --    GLUCOSE, ISTAT mg/dl  --  368*       Results from last 7 days  Lab Units 01/31/19  0557   INR  1 12       * I Have Reviewed All Lab Data Listed Above  * Additional Pertinent Lab Tests Reviewed:  All Labs For Current Hospital Admission Reviewed    Imaging:    Imaging Reports Reviewed Today Include:   Imaging Personally Reviewed by Myself Includes:      Recent Cultures (last 7 days):           Last 24 Hours Medication List:     Current Facility-Administered Medications:  amLODIPine 10 mg Oral Daily DANIELLE Brown complex-vitamin C-folic acid 1 capsule Oral Daily With Jax Rehman MD   calcium acetate 667 mg Oral TID With Meals Evelyn Worthy MD   doxercalciferol 2 mcg Intravenous Once per day on Mon Wed Fri Evelyn Worthy MD   epoetin hamida 10,000 Units Intravenous Once Evelyn Worthy MD   furosemide 80 mg Intravenous BID (diuretic) Jennifer King PA-C   heparin (porcine) 5,000 Units Subcutaneous Q8H [address] Greg Fuchs PA-C   influenza vaccine 0 5 mL Intramuscular Prior to discharge Cj Riley MD   insulin glargine 40 Units Subcutaneous HS Greg Fuchs PA-C   insulin lispro 2-12 Units Subcutaneous TID AC Greg Fuchs PA-C   insulin lispro 2-12 Units Subcutaneous HS Greg Fuchs PA-C   insulin lispro 20 Units Subcutaneous TID With Meals Greg Fuchs PA-C   ondansetron 4 mg Intravenous Q8H PRN Greg Fuchs PA-C   varenicline 1 mg Oral BID Greg Fuchs PA-C        Today, Patient Was Seen By: Cj iRley MD    ** Please Note: This note has been constructed using a voice recognition system   **

## 2019-02-01 NOTE — PROGRESS NOTES
Cardiology Progress Note - Fausto Iyer 70 y o  male MRN: [id number]    Unit/Bed#: -[place] Encounter: [id number]      Assessment:  1  Acute on chronic diastolic heart failure  2  Moderate mitral stenosis  3  Moderate-severe aortic stenosis  4  IDDM  5  Renal cell cancer status post nephrectomy in 2002  6  CKD stage 5    Plan:  Breanna Stapleton diuresed about 2 7L overnight however has been drinking significant amounts of water  Will continue lasix 80mg IV BID  He will be starting HD today which will assist with further volume removal  With regards to his valvular heart disease, this is known by his primary cardiologist Dr Hali Barbour at [facility]  He will require further outpatient echocardiographic surveillance to assess progression  The patient was counseled to maintain a 1 5L fluid restriction and 2g Na restriction  He was understanding  Remainder of volume management per Nephrology with HD  Will sign off  Please call with any further questions or concerns  Outpatient follow up with Dr Chris Garcia:   Patient seen and examined  No active chest or dyspnea  He continues to drink significant amounts of fluid  Objective:     Vitals: Blood pressure 129/80, pulse 103, temperature 98 1 °F (36 7 °C), temperature source Oral, resp  rate 18, height 5' 7" (1 702 m), weight 115 kg (252 lb 6 8 oz), SpO2 92 %  , Body mass index is 39 54 kg/m² , Orthostatic Blood Pressures      Most Recent Value   Blood Pressure  129/80 filed at 02/01/2019 0740   Patient Position - Orthostatic VS  Sitting filed at 02/01/2019 0740            Intake/Output Summary (Last 24 hours) at 02/01/19 1128  Last data filed at 02/01/19 0743   Gross per 24 hour   Intake              250 ml   Output             2800 ml   Net            -2550 ml             Physical Exam:    GEN: Fausto Iyer appears well, alert and oriented x 3, pleasant and cooperative   HEENT: pupils equal, round, and reactive to light; extraocular muscles intact  NECK: supple, no carotid bruits   HEART: regular rhythm, normal S1 and S2, no murmurs, clicks, gallops or rubs   LUNGS: decreased bibasilar breath sounds     ABDOMEN: normal bowel sounds, soft, no tenderness, no distention  EXTREMITIES: peripheral pulses normal; no clubbing, or cyanosis, 2+ edema      Medications:      Current Facility-Administered Medications:     amLODIPine (NORVASC) tablet 10 mg, 10 mg, Oral, Daily, Greg Fuchs PA-C, Stopped at 02/01/19 0932    calcitriol (ROCALTROL) capsule 0 25 mcg, 0 25 mcg, Oral, Daily, Greg Fuchs PA-C, 0 25 mcg at 02/01/19 0933    calcium acetate (PHOSLO) capsule 667 mg, 667 mg, Oral, BID, Greg Fuchs PA-C, 667 mg at 02/01/19 0740    furosemide (LASIX) injection 80 mg, 80 mg, Intravenous, BID (diuretic), Jennifer King PA-C, 80 mg at 01/31/19 1755    heparin (porcine) subcutaneous injection 5,000 Units, 5,000 Units, Subcutaneous, Q8H [facility], 5,000 Units at 02/01/19 0543 **AND** Platelet count, , , Once, Greg Fuchs PA-C    influenza vaccine, high-dose (FLUZONE HIGH-DOSE) IM injection ANNAMARIE 0 5 mL, 0 5 mL, Intramuscular, Prior to discharge, Ralf Richardson MD    insulin glargine (LANTUS) subcutaneous injection 40 Units 0 4 mL, 40 Units, Subcutaneous, HS, Greg Fuchs PA-C, 40 Units at 01/31/19 2144    insulin lispro (HumaLOG) 100 units/mL subcutaneous injection 2-12 Units, 2-12 Units, Subcutaneous, TID AC, 4 Units at 02/01/19 0739 **AND** Fingerstick Glucose (POCT), , , TID ACGreg PA-C    insulin lispro (HumaLOG) 100 units/mL subcutaneous injection 2-12 Units, 2-12 Units, Subcutaneous, HS, Greg Fuchs PA-C, 8 Units at 01/31/19 2145    insulin lispro (HumaLOG) 100 units/mL subcutaneous injection 20 Units, 20 Units, Subcutaneous, TID With Meals, Greg Fuchs PA-C, 20 Units at 02/01/19 0739    ondansetron (ZOFRAN) injection 4 mg, 4 mg, Intravenous, Q8H PRN, Greg Fuchs PA-C    varenicline (CHANTIX) tablet 1 mg, 1 mg, Oral, BID, Greg Fuchs PA-C, 1 mg at 02/01/19 0933     Labs & Results:      Results from last 7 days  Lab Units 01/31/19  0614 01/31/19 0319 01/31/19 0004   TROPONIN I ng/mL 0 20* 0 14* 0 03       Results from last 7 days  Lab Units 01/31/19  0557 01/31/19 0319 01/31/19 0010 01/31/19 0004   WBC Thousand/uL 12 26*  --   --  11 92*   HEMOGLOBIN g/dL 10 4*  --   --  11 4*   I STAT HEMOGLOBIN g/dl  --   --  11 9*  --    HEMATOCRIT % 33 5*  --   --  36 7   HEMATOCRIT, ISTAT %  --   --  35*  --    PLATELETS Thousands/uL 272 265  --  301           Results from last 7 days  Lab Units 01/31/19 0557 01/31/19 0010 01/31/19  0004   POTASSIUM mmol/L 4 7  --  4 5   CHLORIDE mmol/L 103  --  102   CO2 mmol/L 22  --  22   CO2, I-STAT mmol/L  --  23  --    BUN mg/dL 59*  --  58*   CREATININE mg/dL 5 50*  --  5 50*   CALCIUM mg/dL 8 3  --  8 6   ALK PHOS U/L 91  --  98   ALT U/L 10*  --  12   AST U/L 9  --  11   GLUCOSE, ISTAT mg/dl  --  368*  --        Results from last 7 days  Lab Units 01/31/19  0557 01/31/19  0004   INR  1 12 1 04   PTT seconds 32  --        Results from last 7 days  Lab Units 01/31/19 0557   MAGNESIUM mg/dL 1 7             Counseling / Coordination of Care  Total floor / unit time spent today 30 minutes  Greater than 50% of total time was spent with the patient and / or family counseling and / or coordination of care

## 2019-02-01 NOTE — SOCIAL WORK
Patient is a new HD referral for HD sent to [address] [place] [address]  CM will assess patient's date and time preference

## 2019-02-01 NOTE — PLAN OF CARE
Problem: PHYSICAL THERAPY ADULT  Goal: Performs mobility at highest level of function for planned discharge setting  See evaluation for individualized goals  Treatment/Interventions: Functional transfer training, LE strengthening/ROM, Elevations, Therapeutic exercise, Endurance training, Patient/family training, Equipment eval/education, Bed mobility, Gait training, Spoke to nursing, Spoke to case management  Equipment Recommended:  (none anticipated)       See flowsheet documentation for full assessment, interventions and recommendations  Prognosis: Good  Problem List: Decreased strength, Decreased endurance, Impaired balance, Decreased mobility, Decreased safety awareness  Assessment: Pt is 70 y o  male seen for PT evaluation on 2/1/2019 s/p admit to [facility] on 1/30/2019 w/ Acute renal failure superimposed on chronic kidney disease ([facility] [address] )  Pt presents c SOB c progressive worsening over past several days PTA  PT consulted to assess pt's functional mobility and d/c needs  Order placed for PT eval and tx, w/ up and OOB as tolerated order  Performed at least 2 patient identifiers during session: Name and wristband  Comorbidities affecting pt's physical performance at time of assessment include: kidney CA, CHF, DM, HLD, HTN, renal disorder  PTA, pt was independent w/ all functional mobility w/ no AD/DME, ambulates unrestricted distances and all terrain, has 0 DENITA and lives w/ 2 roommates (upstairs) in 2 level home (pt downstairs)  Personal factors affecting pt at time of IE include: inability to navigate community distances, limited home support and positive fall history   Please find objective findings from PT assessment regarding body systems outlined above with impairments and limitations including weakness, impaired balance, decreased endurance, gait deviations, decreased activity tolerance and fall risk, as well as mobility assessment (need for SBA assist w/ all phases of mobility when usually ambulating independently and need for cueing for mobility technique)  The following objective measures performed on IE also reveal limitations: Barthel Index: 55/100 and Modified Wei: 3 (moderate disability)  Pt's clinical presentation is currently unstable/unpredictable seen in pt's presentation of need for input for task focus and mobility technique, on telemetry monitoring and ongoing medical assessment  Pt to benefit from continued PT tx to address deficits as defined above and maximize level of functional independent mobility and consistency  From PT/mobility standpoint, recommendation at time of d/c would be anticipated Home PT pending progress in order to facilitate return to PLOF  Barriers to Discharge: Decreased caregiver support     Recommendation: Home PT, OT consult, Home with family support (anticipated pending progress)     PT - OK to Discharge: No    See flowsheet documentation for full assessment

## 2019-02-01 NOTE — BRIEF OP NOTE (RAD/CATH)
Tunneled Hemodialysis catheter placement  Procedure Note    PATIENT NAME: Xochitl Terrazas  : 1950  MRN: [id number]     Pre-op Diagnosis:   1  Pulmonary edema    2  Acute renal failure superimposed on chronic kidney disease ([facility] )    3  Acute on chronic congestive heart failure, unspecified heart failure type (HCC)      Post-op Diagnosis:   1  Pulmonary edema    2  Acute renal failure superimposed on chronic kidney disease ([facility] )    3  Acute on chronic congestive heart failure, unspecified heart failure type [facility])        Surgeon:   Joe Lorenzo MD    Estimated Blood Loss: 3 cc    Findings: Successful placement of right IJV tunneled hemodialysis catheter  Catheter is ready for use      Specimens: None    Complications:  None    Anesthesia: Conscious sedation and Local    Joe Lorenzo MD     Date: 2019  Time: 7:17 PM

## 2019-02-01 NOTE — PROGRESS NOTES
NEPHROLOGY PROGRESS NOTE    Patient: Xochitl Terrazas               Sex: male          DOA: 1/30/2019 11:45 PM   YOB: 1950        Age:  70 y o         LOS:  LOS: 1 day       HPI     Patient with stage 5 CKD admitted with seems of so uremia for dialysis    SUBJECTIVE     Make he is feeling well  Had dialysis catheter inserted  Will be getting dialysis today    No nausea no vomiting but does have shortness of breath on exertion    No chest pain no palpitation    CURRENT MEDICATIONS       Current Facility-Administered Medications:     amLODIPine (NORVASC) tablet 10 mg, 10 mg, Oral, Daily, Greg Fuchs PA-C, Stopped at 02/01/19 0932    calcitriol (ROCALTROL) capsule 0 25 mcg, 0 25 mcg, Oral, Daily, Greg Fuchs PA-C, 0 25 mcg at 02/01/19 0933    calcium acetate (PHOSLO) capsule 667 mg, 667 mg, Oral, BID, Greg Fuchs PA-C, 667 mg at 02/01/19 1212    furosemide (LASIX) injection 80 mg, 80 mg, Intravenous, BID (diuretic), Jennifer King PA-C, 80 mg at 01/31/19 1755    heparin (porcine) subcutaneous injection 5,000 Units, 5,000 Units, Subcutaneous, Q8H [facility], 5,000 Units at 02/01/19 0543 **AND** Platelet count, , , Once, Greg Fuchs PA-C    influenza vaccine, high-dose (FLUZONE HIGH-DOSE) IM injection ANNAMARIE 0 5 mL, 0 5 mL, Intramuscular, Prior to discharge, Hiram Song MD    insulin glargine (LANTUS) subcutaneous injection 40 Units 0 4 mL, 40 Units, Subcutaneous, HS, Greg Fuchs PA-C, 40 Units at 01/31/19 2144    insulin lispro (HumaLOG) 100 units/mL subcutaneous injection 2-12 Units, 2-12 Units, Subcutaneous, TID AC, 4 Units at 02/01/19 0739 **AND** Fingerstick Glucose (POCT), , , TID AC, Greg Fuchs PA-C    insulin lispro (HumaLOG) 100 units/mL subcutaneous injection 2-12 Units, 2-12 Units, Subcutaneous, HS, Greg Fuchs PA-C, 8 Units at 01/31/19 2145    insulin lispro (HumaLOG) 100 units/mL subcutaneous injection 20 Units, 20 Units, Subcutaneous, TID With Meals, Greg Fuchs PA-C, 20 Units at 02/01/19 1208    ondansetron (ZOFRAN) injection 4 mg, 4 mg, Intravenous, Q8H PRN, Greg Fuchs PA-C    varenicline (CHANTIX) tablet 1 mg, 1 mg, Oral, BID, Greg Fuchs PA-C, 1 mg at 02/01/19 0933    OBJECTIVE     Current Weight: Weight - Scale: 115 kg (252 lb 6 8 oz)  Vitals:    02/01/19 0740   BP: 129/80   Pulse: 103   Resp: 18   Temp: 98 1 °F (36 7 °C)   SpO2: 92%       Intake/Output Summary (Last 24 hours) at 02/01/19 1229  Last data filed at 02/01/19 0743   Gross per 24 hour   Intake              250 ml   Output             2800 ml   Net            -2550 ml       PHYSICAL EXAMINATION     Physical Exam   Constitutional: He is oriented to person, place, and time  He appears well-developed  No distress  HENT:   Head: Normocephalic and atraumatic  Mouth/Throat: Oropharynx is clear and moist    Eyes: Pupils are equal, round, and reactive to light  Conjunctivae are normal  No scleral icterus  Neck: Normal range of motion  Neck supple  No JVD present  Cardiovascular: Normal rate, regular rhythm and normal heart sounds  No murmur heard  Pulmonary/Chest: Effort normal and breath sounds normal  No respiratory distress  He has no wheezes  Abdominal: Soft  Bowel sounds are normal  He exhibits no distension and no mass  There is no tenderness  Musculoskeletal: Normal range of motion  He exhibits no edema  Neurological: He is alert and oriented to person, place, and time  Skin: Skin is warm  No rash noted  Psychiatric: He has a normal mood and affect   His behavior is normal         LAB RESULTS       Results from last 7 days  Lab Units 01/31/19  1425 01/31/19  0557 01/31/19  0319 01/31/19  0010 01/31/19  0004   WBC Thousand/uL  --  12 26*  --   --  11 92*   HEMOGLOBIN g/dL  --  10 4*  --   --  11 4*   I STAT HEMOGLOBIN g/dl  --   --   --  11 9*  --    HEMATOCRIT %  --  33 5*  --   --  36 7   HEMATOCRIT, ISTAT %  --   --   --  35*  --    PLATELETS Thousands/uL  --  272 265  --  301   POTASSIUM mmol/L  --  4 7  -- --  4 5   CHLORIDE mmol/L  --  103  --   --  102   CO2 mmol/L  --  22  --   --  22   CO2, I-STAT mmol/L  --   --   --  23  --    BUN mg/dL  --  59*  --   --  58*   CREATININE mg/dL  --  5 50*  --   --  5 50*   EGFR ml/min/1 73sq m  --  10  --  10 10   CALCIUM mg/dL  --  8 3  --   --  8 6   MAGNESIUM mg/dL  --  1 7  --   --   --    PHOSPHORUS mg/dL 4 5* 4 8*  --   --   --    GLUCOSE, ISTAT mg/dl  --   --   --  368*  --        RADIOLOGY RESULTS      Results for orders placed during the hospital encounter of 01/30/19   XR chest 1 view portable    Narrative CHEST     INDICATION:   heart failure  COMPARISON:  None    EXAM PERFORMED/VIEWS:  XR CHEST PORTABLE      FINDINGS:    Cardiac silhouette is enlarged  Distended central pulmonary vasculature  No airspace consolidation, pneumothorax, or large pleural effusion  Osseous structures appear within normal limits for patient age  Impression Cardiomegaly and central pulmonary vascular congestion  Findings concur with the preliminary report by the referring clinician already in PACS and/or our electronic record EPIC  Workstation performed: [id number]       No results found for this or any previous visit  No results found for this or any previous visit  No results found for this or any previous visit  No results found for this or any previous visit  No results found for this or any previous visit  PLAN / RECOMMENDATIONS      CKD stage 5: Will start dialysis today  Discussed everything at length with him and his family  At present he will like to go to outpatient dialysis  Home dialysis also discussed with him  He lives alone so it may be difficult for home dialysis  Hyperparathyroidism:  Will check phosphorus and PTH     Anemia:  Will check iron study and start him on EPO    Diabetes type 2:   Will continue to monitor Glucose    Hypertension seems to be reasonably well controlled    Pleural effusion:    Disposition:  Patient will need outpatient dialysis unit  I discussed that with  and patient at Danni Head MD  Nephrology  2/1/2019        Portions of the record may have been created with voice recognition software  Occasional wrong word or "sound a like" substitutions may have occurred due to the inherent limitations of voice recognition software  Read the chart carefully and recognize, using context, where substitutions have occurred

## 2019-02-02 ENCOUNTER — APPOINTMENT (INPATIENT)
Dept: DIALYSIS | Facility: HOSPITAL | Age: 69
DRG: 673 | End: 2019-02-02
Payer: COMMERCIAL

## 2019-02-02 LAB
ANION GAP SERPL CALCULATED.3IONS-SCNC: 10 MMOL/L (ref 4–13)
BASOPHILS # BLD AUTO: 0.05 THOUSANDS/ΜL (ref 0–0.1)
BASOPHILS NFR BLD AUTO: 0 % (ref 0–1)
BUN SERPL-MCNC: 44 MG/DL (ref 5–25)
CALCIUM SERPL-MCNC: 8.7 MG/DL (ref 8.3–10.1)
CHLORIDE SERPL-SCNC: 102 MMOL/L (ref 100–108)
CO2 SERPL-SCNC: 27 MMOL/L (ref 21–32)
CREAT SERPL-MCNC: 4.4 MG/DL (ref 0.6–1.3)
EOSINOPHIL # BLD AUTO: 0.54 THOUSAND/ΜL (ref 0–0.61)
EOSINOPHIL NFR BLD AUTO: 5 % (ref 0–6)
ERYTHROCYTE [DISTWIDTH] IN BLOOD BY AUTOMATED COUNT: 13.9 % (ref 11.6–15.1)
FERRITIN SERPL-MCNC: 99 NG/ML (ref 8–388)
GFR SERPL CREATININE-BSD FRML MDRD: 13 ML/MIN/1.73SQ M
GLUCOSE SERPL-MCNC: 112 MG/DL (ref 65–140)
GLUCOSE SERPL-MCNC: 117 MG/DL (ref 65–140)
GLUCOSE SERPL-MCNC: 147 MG/DL (ref 65–140)
GLUCOSE SERPL-MCNC: 171 MG/DL (ref 65–140)
GLUCOSE SERPL-MCNC: 278 MG/DL (ref 65–140)
HCT VFR BLD AUTO: 35.4 % (ref 36.5–49.3)
HGB BLD-MCNC: 11.2 G/DL (ref 12–17)
IMM GRANULOCYTES # BLD AUTO: 0.08 THOUSAND/UL (ref 0–0.2)
IMM GRANULOCYTES NFR BLD AUTO: 1 % (ref 0–2)
IRON SATN MFR SERPL: 15 %
IRON SERPL-MCNC: 33 UG/DL (ref 65–175)
LYMPHOCYTES # BLD AUTO: 2.5 THOUSANDS/ΜL (ref 0.6–4.47)
LYMPHOCYTES NFR BLD AUTO: 21 % (ref 14–44)
MCH RBC QN AUTO: 28.4 PG (ref 26.8–34.3)
MCHC RBC AUTO-ENTMCNC: 31.6 G/DL (ref 31.4–37.4)
MCV RBC AUTO: 90 FL (ref 82–98)
MONOCYTES # BLD AUTO: 0.93 THOUSAND/ΜL (ref 0.17–1.22)
MONOCYTES NFR BLD AUTO: 8 % (ref 4–12)
NEUTROPHILS # BLD AUTO: 7.91 THOUSANDS/ΜL (ref 1.85–7.62)
NEUTS SEG NFR BLD AUTO: 65 % (ref 43–75)
NRBC BLD AUTO-RTO: 0 /100 WBCS
PLATELET # BLD AUTO: 280 THOUSANDS/UL (ref 149–390)
PMV BLD AUTO: 11 FL (ref 8.9–12.7)
POTASSIUM SERPL-SCNC: 3.8 MMOL/L (ref 3.5–5.3)
RBC # BLD AUTO: 3.94 MILLION/UL (ref 3.88–5.62)
SODIUM SERPL-SCNC: 139 MMOL/L (ref 136–145)
TIBC SERPL-MCNC: 216 UG/DL (ref 250–450)
WBC # BLD AUTO: 12.01 THOUSAND/UL (ref 4.31–10.16)

## 2019-02-02 PROCEDURE — 83550 IRON BINDING TEST: CPT | Performed by: INTERNAL MEDICINE

## 2019-02-02 PROCEDURE — 99232 SBSQ HOSP IP/OBS MODERATE 35: CPT | Performed by: INTERNAL MEDICINE

## 2019-02-02 PROCEDURE — 83540 ASSAY OF IRON: CPT | Performed by: INTERNAL MEDICINE

## 2019-02-02 PROCEDURE — 5A1D70Z PERFORMANCE OF URINARY FILTRATION, INTERMITTENT, LESS THAN 6 HOURS PER DAY: ICD-10-PCS | Performed by: INTERNAL MEDICINE

## 2019-02-02 PROCEDURE — 82728 ASSAY OF FERRITIN: CPT | Performed by: INTERNAL MEDICINE

## 2019-02-02 PROCEDURE — 80048 BASIC METABOLIC PNL TOTAL CA: CPT | Performed by: INTERNAL MEDICINE

## 2019-02-02 PROCEDURE — 82948 REAGENT STRIP/BLOOD GLUCOSE: CPT

## 2019-02-02 PROCEDURE — 90935 HEMODIALYSIS ONE EVALUATION: CPT | Performed by: INTERNAL MEDICINE

## 2019-02-02 PROCEDURE — 85025 COMPLETE CBC W/AUTO DIFF WBC: CPT | Performed by: INTERNAL MEDICINE

## 2019-02-02 RX ORDER — ACETAMINOPHEN 325 MG/1
650 TABLET ORAL EVERY 6 HOURS PRN
Status: DISCONTINUED | OUTPATIENT
Start: 2019-02-02 | End: 2019-02-07 | Stop reason: HOSPADM

## 2019-02-02 RX ADMIN — VARENICLINE TARTRATE 1 MG: 1 TABLET, FILM COATED ORAL at 12:05

## 2019-02-02 RX ADMIN — HEPARIN SODIUM 5000 UNITS: 5000 INJECTION, SOLUTION INTRAVENOUS; SUBCUTANEOUS at 23:24

## 2019-02-02 RX ADMIN — INSULIN LISPRO 20 UNITS: 100 INJECTION, SOLUTION INTRAVENOUS; SUBCUTANEOUS at 18:10

## 2019-02-02 RX ADMIN — INSULIN GLARGINE 40 UNITS: 100 INJECTION, SOLUTION SUBCUTANEOUS at 23:24

## 2019-02-02 RX ADMIN — INSULIN LISPRO 2 UNITS: 100 INJECTION, SOLUTION INTRAVENOUS; SUBCUTANEOUS at 07:47

## 2019-02-02 RX ADMIN — VARENICLINE TARTRATE 1 MG: 1 TABLET, FILM COATED ORAL at 18:10

## 2019-02-02 RX ADMIN — CALCIUM ACETATE 667 MG: 667 CAPSULE ORAL at 07:45

## 2019-02-02 RX ADMIN — INSULIN LISPRO 6 UNITS: 100 INJECTION, SOLUTION INTRAVENOUS; SUBCUTANEOUS at 17:00

## 2019-02-02 RX ADMIN — Medication 1 CAPSULE: at 18:09

## 2019-02-02 RX ADMIN — CALCIUM ACETATE 667 MG: 667 CAPSULE ORAL at 14:08

## 2019-02-02 RX ADMIN — FUROSEMIDE 80 MG: 10 INJECTION, SOLUTION INTRAMUSCULAR; INTRAVENOUS at 17:00

## 2019-02-02 RX ADMIN — CALCIUM ACETATE 667 MG: 667 CAPSULE ORAL at 18:08

## 2019-02-02 RX ADMIN — HEPARIN SODIUM 5000 UNITS: 5000 INJECTION, SOLUTION INTRAVENOUS; SUBCUTANEOUS at 14:10

## 2019-02-02 RX ADMIN — INSULIN LISPRO 20 UNITS: 100 INJECTION, SOLUTION INTRAVENOUS; SUBCUTANEOUS at 07:47

## 2019-02-02 RX ADMIN — FUROSEMIDE 80 MG: 10 INJECTION, SOLUTION INTRAMUSCULAR; INTRAVENOUS at 11:38

## 2019-02-02 RX ADMIN — INSULIN LISPRO 20 UNITS: 100 INJECTION, SOLUTION INTRAVENOUS; SUBCUTANEOUS at 14:09

## 2019-02-02 RX ADMIN — HEPARIN SODIUM 5000 UNITS: 5000 INJECTION, SOLUTION INTRAVENOUS; SUBCUTANEOUS at 06:17

## 2019-02-02 NOTE — HEMODIALYSIS
Pt second tx completed via catheter  Adequate blood flow through tx  Pt tolerated tx well  Vitals stable  2 l removed

## 2019-02-02 NOTE — PROGRESS NOTES
HEMODIALYSIS ROUNDING NOTE    Patient: Nish Gonzáles               Sex: male          DOA: 1/30/2019 11:45 PM   YOB: 1950        Age:  70 y o         LOS:  LOS: 2 days             SUBJECTIVE     - Patient was seen during hemodialysis today  - Reviewed last 24 hrs events     This is the 2nd treatment and is tolerating well  Tolerating fluid off    Has some cramping yesterday but much better today    CURRENT MEDICATIONS       Current Facility-Administered Medications:     acetaminophen (TYLENOL) tablet 650 mg, 650 mg, Oral, Q6H PRN, Ayo Adame MD    amLODIPine (NORVASC) tablet 10 mg, 10 mg, Oral, Daily, Greg Fuchs PA-C, Stopped at 02/01/19 0932    b complex-vitamin C-folic acid (NEPHROCAPS) capsule 1 capsule, 1 capsule, Oral, Daily With Kashif Velasquez MD, 1 capsule at 02/01/19 1812    calcium acetate (PHOSLO) capsule 667 mg, 667 mg, Oral, TID With Meals, Dionna Banegas MD, 667 mg at 02/02/19 0745    doxercalciferol (HECTOROL) injection 2 mcg, 2 mcg, Intravenous, Once per day on Mon Wed Fri, Dionna Banegas MD, 2 mcg at 02/01/19 1554    furosemide (LASIX) injection 80 mg, 80 mg, Intravenous, BID (diuretic), Jennifer King PA-C, 80 mg at 02/02/19 1138    heparin (porcine) subcutaneous injection 5,000 Units, 5,000 Units, Subcutaneous, Q8H [facility], 5,000 Units at 02/02/19 0617 **AND** Platelet count, , , Once, Greg Fuchs PA-C    influenza vaccine, high-dose (FLUZONE HIGH-DOSE) IM injection ANNAMARIE 0 5 mL, 0 5 mL, Intramuscular, Prior to discharge, Ayo Adame MD    insulin glargine (LANTUS) subcutaneous injection 40 Units 0 4 mL, 40 Units, Subcutaneous, HS, Greg Fuchs PA-C, 20 Units at 02/01/19 2156    insulin lispro (HumaLOG) 100 units/mL subcutaneous injection 2-12 Units, 2-12 Units, Subcutaneous, TID AC, 2 Units at 02/02/19 0747 **AND** Fingerstick Glucose (POCT), , , TID AC, Greg Fuchs PA-C    insulin lispro (HumaLOG) 100 units/mL subcutaneous injection 2-12 Units, 2-12 Units, Subcutaneous, HS, Greg Fuchs PA-C, 8 Units at 01/31/19 2145    insulin lispro (HumaLOG) 100 units/mL subcutaneous injection 20 Units, 20 Units, Subcutaneous, TID With Meals, Greg Fuchs PA-C, 20 Units at 02/02/19 0747    ondansetron (ZOFRAN) injection 4 mg, 4 mg, Intravenous, Q8H PRN, Greg Fuchs PA-C    varenicline (CHANTIX) tablet 1 mg, 1 mg, Oral, BID, Greg Fuchs PA-C, 1 mg at 02/01/19 1812    OBJECTIVE     Current Weight: Weight - Scale: 116 kg (255 lb 11 7 oz)  Vitals:    02/02/19 1100   BP: 133/79   Pulse: 92   Resp:    Temp:    SpO2:        Intake/Output Summary (Last 24 hours) at 02/02/19 1142  Last data filed at 02/02/19 0810   Gross per 24 hour   Intake             1220 ml   Output             2500 ml   Net            -1280 ml       PHYSICAL EXAMINATION     Physical Exam   Constitutional: He is oriented to person, place, and time  He appears well-developed  No distress  HENT:   Head: Normocephalic  Mouth/Throat: Oropharynx is clear and moist    Eyes: Conjunctivae are normal  No scleral icterus  Neck: Neck supple  No JVD present  Cardiovascular: Normal rate and normal heart sounds  Pulmonary/Chest: Effort normal  He has no wheezes  Abdominal: Soft  There is no tenderness  Musculoskeletal: Normal range of motion  He exhibits no edema  Neurological: He is alert and oriented to person, place, and time  Skin: Skin is warm  No rash noted  Psychiatric: He has a normal mood and affect   His behavior is normal          LAB RESULTS       Results from last 7 days  Lab Units 02/02/19  0523 02/01/19  1402 01/31/19  1425 01/31/19  0557 01/31/19  0319 01/31/19  0010 01/31/19  0004   WBC Thousand/uL 12 01* 11 87*  --  12 26*  --   --  11 92*   HEMOGLOBIN g/dL 11 2* 10 9*  --  10 4*  --   --  11 4*   I STAT HEMOGLOBIN g/dl  --   --   --   --   --  11 9*  --    HEMATOCRIT % 35 4* 34 4*  --  33 5*  --   --  36 7   HEMATOCRIT, ISTAT %  --   --   --   --   --  35*  --    PLATELETS Thousands/uL 280 282 --  272 265  --  301   POTASSIUM mmol/L 3 8 4 2  --  4 7  --   --  4 5   CHLORIDE mmol/L 102 101  --  103  --   --  102   CO2 mmol/L 27 26  --  22  --   --  22   CO2, I-STAT mmol/L  --   --   --   --   --  23  --    BUN mg/dL 44* 66*  --  59*  --   --  58*   CREATININE mg/dL 4 40* 5 56*  --  5 50*  --   --  5 50*   EGFR ml/min/1 73sq m 13 10  --  10  --  10 10   CALCIUM mg/dL 8 7 8 7  --  8 3  --   --  8 6   MAGNESIUM mg/dL  --   --   --  1 7  --   --   --    PHOSPHORUS mg/dL  --   --  4 5* 4 8*  --   --   --    GLUCOSE, ISTAT mg/dl  --   --   --   --   --  368*  --        RADIOLOGY RESULTS     Results for orders placed during the hospital encounter of 01/30/19   XR chest 1 view portable    Narrative CHEST     INDICATION:   heart failure  COMPARISON:  None    EXAM PERFORMED/VIEWS:  XR CHEST PORTABLE      FINDINGS:    Cardiac silhouette is enlarged  Distended central pulmonary vasculature  No airspace consolidation, pneumothorax, or large pleural effusion  Osseous structures appear within normal limits for patient age  Impression Cardiomegaly and central pulmonary vascular congestion  Findings concur with the preliminary report by the referring clinician already in PACS and/or our electronic record EPIC  Workstation performed: [id number]       No results found for this or any previous visit  No results found for this or any previous visit  No results found for this or any previous visit  No results found for this or any previous visit  No results found for this or any previous visit  PLAN / RECOMMENDATIONS     1  End Stage Renal Disease:       I saw and examined patient during hemodialysis treatment  The patient was receiving hemodialysis for treatment of end stage renal disease  I have also reviewed vital signs, intake and output, lab results and recent events, and agreed with today's dialysis order  Access: No issue  Has dialysis catheter    Also has AV fistula which is not ready to be used    2  Anemia:   stable    Disposition:  Will need outpatient dialysis unit before can be discharged    Robin Roy MD  Nephrology  2/2/2019        Portions of the record may have been created with voice recognition software  Occasional wrong word or "sound a like" substitutions may have occurred due to the inherent limitations of voice recognition software  Read the chart carefully and recognize, using context, where substitutions have occurred

## 2019-02-02 NOTE — PROGRESS NOTES
[facility] Progress Note  Patient: Juan Manuel Pete 70 y o  male   MRN: [id number]  PCP: Francis Godinez MD  Unit/Bed#: [facility] Encounter: [id number]  Date Of Visit: 19    Assessment:    Principal Problem:    Acute renal failure superimposed on chronic kidney disease ([facility] )  Active Problems:    Other hyperlipidemia    Type 2 diabetes mellitus with hyperglycemia ([place] [address] )    Essential hypertension    Acute on chronic congestive heart failure ([facility] )      Plan:    · 1  Acute on chronic renal failure- Patient has temporary HD catheter and for HD and had dialysis yesterday  Will f/u nephrology recommendations  · 2  SOB- mostlikely secondary to #1  Cardiology following  Patient 2d echo EF 65%  · 3  History of left renal cell carcinoma s/p nephrectomy   · 4  DM- on lantus and ISS  · 5  Smoking- on chantix  ·        VTE Pharmacologic Prophylaxis:   Pharmacologic: Heparin  Mechanical VTE Prophylaxis in Place: Yes    Patient Centered Rounds: I have performed bedside rounds with nursing staff today  Discussions with Specialists or Other Care Team Provider:     Education and Discussions with Family / Patient:     Time Spent for Care: 20 minutes  More than 50% of total time spent on counseling and coordination of care as described above  Current Length of Stay: 2 day(s)    Current Patient Status: Inpatient   Certification Statement: The patient will continue to require additional inpatient hospital stay due to ESRD on HD    Discharge Plan / Estimated Discharge Date: once above issues resolve      Code Status: Level 1 - Full Code      Subjective:   Patient seen and examined at bedside  Patient has no new complaints      Objective:     Vitals:   Temp (24hrs), Av 1 °F (36 7 °C), Min:97 5 °F (36 4 °C), Max:98 4 °F (36 9 °C)    Temp:  [97 5 °F (36 4 °C)-98 4 °F (36 9 °C)] 98 °F (36 7 °C)  HR:  [87-98] 92  Resp:  [17-20] 18  BP: (107-162)/(52-91) 133/79  SpO2:  [93 %-97 %] 94 %  Body mass index is 40 05 kg/m²  Input and Output Summary (last 24 hours): Intake/Output Summary (Last 24 hours) at 02/02/19 1122  Last data filed at 02/02/19 0810   Gross per 24 hour   Intake             1220 ml   Output             2500 ml   Net            -1280 ml       Physical Exam:     Physical Exam   Constitutional: He is oriented to person, place, and time  He appears well-developed and well-nourished  HENT:   Head: Normocephalic and atraumatic  Eyes: Pupils are equal, round, and reactive to light  Conjunctivae and EOM are normal    Neck: Normal range of motion  Neck supple  No JVD present  No tracheal deviation present  No thyromegaly present  Cardiovascular: Normal rate, regular rhythm and normal heart sounds  Exam reveals no gallop and no friction rub  No murmur heard  Pulmonary/Chest: Effort normal and breath sounds normal  No respiratory distress  He has no wheezes  He has no rales  Abdominal: Soft  Bowel sounds are normal  He exhibits no distension  There is no tenderness  There is no rebound  Musculoskeletal: He exhibits edema  Neurological: He is alert and oriented to person, place, and time  Skin: Skin is warm and dry  No rash noted  No erythema             Additional Data:     Labs:      Results from last 7 days  Lab Units 02/02/19  0523   WBC Thousand/uL 12 01*   HEMOGLOBIN g/dL 11 2*   HEMATOCRIT % 35 4*   PLATELETS Thousands/uL 280   NEUTROS PCT % 65   LYMPHS PCT % 21   MONOS PCT % 8   EOS PCT % 5       Results from last 7 days  Lab Units 02/02/19  0523  01/31/19  0557 01/31/19  0010   POTASSIUM mmol/L 3 8  < > 4 7  --    CHLORIDE mmol/L 102  < > 103  --    CO2 mmol/L 27  < > 22  --    CO2, I-STAT mmol/L  --   --   --  23   BUN mg/dL 44*  < > 59*  --    CREATININE mg/dL 4 40*  < > 5 50*  --    CALCIUM mg/dL 8 7  < > 8 3  --    ALK PHOS U/L  --   --  91  --    ALT U/L  --   --  10*  --    AST U/L  --   --  9  --    GLUCOSE, ISTAT mg/dl  --   --   --  368*   < > = values in this interval not displayed  Results from last 7 days  Lab Units 01/31/19  0557   INR  1 12       * I Have Reviewed All Lab Data Listed Above  * Additional Pertinent Lab Tests Reviewed: [place] [facility] Admission Reviewed    Imaging:    Imaging Reports Reviewed Today Include:   Imaging Personally Reviewed by Myself Includes:      Recent Cultures (last 7 days):           Last 24 Hours Medication List:     Current Facility-Administered Medications:  acetaminophen 650 mg Oral Q6H PRN Charly Ríos MD   amLODIPine 10 mg Oral Daily Greg Fuchs PA-C   b complex-vitamin C-folic acid 1 capsule Oral Daily With Yrn Cisneros MD   calcium acetate 667 mg Oral TID With Meals Michelle Haile MD   doxercalciferol 2 mcg Intravenous Once per day on Mon Wed Fri Michelle Haile MD   furosemide 80 mg Intravenous BID (diuretic) Lachelle Barrientos PA-C   heparin (porcine) 5,000 Units Subcutaneous Q8H [address] Greg Fuchs PA-C   influenza vaccine 0 5 mL Intramuscular Prior to discharge Charly Ríos MD   insulin glargine 40 Units Subcutaneous HS Greg Fuchs PA-C   insulin lispro 2-12 Units Subcutaneous TID AC Greg Fuchs PA-C   insulin lispro 2-12 Units Subcutaneous HS Greg Fuchs PA-C   insulin lispro 20 Units Subcutaneous TID With Meals Greg Fuchs PA-C   ondansetron 4 mg Intravenous Q8H PRN Greg Fuchs PA-C   varenicline 1 mg Oral BID Greg Fuchs PA-C        Today, Patient Was Seen By: Charly Ríos MD    ** Please Note: This note has been constructed using a voice recognition system   **

## 2019-02-02 NOTE — PLAN OF CARE
Maintains optimal cardiac output and hemodynamic stability Progressing      Discharge to home or other facility with appropriate resources Progressing      Maintains hematologic stability Progressing      Absence or prevention of progression during hospitalization Progressing      Patient/family/caregiver demonstrates understanding of disease process, treatment plan, medications, and discharge instructions Progressing      Glucose maintained within target range Progressing      Patient will remain free of falls Progressing      Maintain or return to baseline ADL function Progressing

## 2019-02-03 LAB
ANION GAP SERPL CALCULATED.3IONS-SCNC: 9 MMOL/L (ref 4–13)
BASOPHILS # BLD AUTO: 0.08 THOUSANDS/ΜL (ref 0–0.1)
BASOPHILS NFR BLD AUTO: 1 % (ref 0–1)
BUN SERPL-MCNC: 34 MG/DL (ref 5–25)
CALCIUM SERPL-MCNC: 8.5 MG/DL (ref 8.3–10.1)
CHLORIDE SERPL-SCNC: 99 MMOL/L (ref 100–108)
CO2 SERPL-SCNC: 31 MMOL/L (ref 21–32)
CREAT SERPL-MCNC: 4.16 MG/DL (ref 0.6–1.3)
EOSINOPHIL # BLD AUTO: 0.58 THOUSAND/ΜL (ref 0–0.61)
EOSINOPHIL NFR BLD AUTO: 5 % (ref 0–6)
ERYTHROCYTE [DISTWIDTH] IN BLOOD BY AUTOMATED COUNT: 13.7 % (ref 11.6–15.1)
GFR SERPL CREATININE-BSD FRML MDRD: 14 ML/MIN/1.73SQ M
GLUCOSE SERPL-MCNC: 104 MG/DL (ref 65–140)
GLUCOSE SERPL-MCNC: 106 MG/DL (ref 65–140)
GLUCOSE SERPL-MCNC: 108 MG/DL (ref 65–140)
GLUCOSE SERPL-MCNC: 221 MG/DL (ref 65–140)
GLUCOSE SERPL-MCNC: 98 MG/DL (ref 65–140)
HCT VFR BLD AUTO: 37.1 % (ref 36.5–49.3)
HGB BLD-MCNC: 11.8 G/DL (ref 12–17)
IMM GRANULOCYTES # BLD AUTO: 0.07 THOUSAND/UL (ref 0–0.2)
IMM GRANULOCYTES NFR BLD AUTO: 1 % (ref 0–2)
LYMPHOCYTES # BLD AUTO: 2.71 THOUSANDS/ΜL (ref 0.6–4.47)
LYMPHOCYTES NFR BLD AUTO: 21 % (ref 14–44)
MCH RBC QN AUTO: 28.8 PG (ref 26.8–34.3)
MCHC RBC AUTO-ENTMCNC: 31.8 G/DL (ref 31.4–37.4)
MCV RBC AUTO: 91 FL (ref 82–98)
MONOCYTES # BLD AUTO: 1.14 THOUSAND/ΜL (ref 0.17–1.22)
MONOCYTES NFR BLD AUTO: 9 % (ref 4–12)
NEUTROPHILS # BLD AUTO: 8.08 THOUSANDS/ΜL (ref 1.85–7.62)
NEUTS SEG NFR BLD AUTO: 63 % (ref 43–75)
NRBC BLD AUTO-RTO: 0 /100 WBCS
PLATELET # BLD AUTO: 252 THOUSANDS/UL (ref 149–390)
PMV BLD AUTO: 10.2 FL (ref 8.9–12.7)
POTASSIUM SERPL-SCNC: 3.6 MMOL/L (ref 3.5–5.3)
RBC # BLD AUTO: 4.1 MILLION/UL (ref 3.88–5.62)
SODIUM SERPL-SCNC: 139 MMOL/L (ref 136–145)
WBC # BLD AUTO: 12.66 THOUSAND/UL (ref 4.31–10.16)

## 2019-02-03 PROCEDURE — 99232 SBSQ HOSP IP/OBS MODERATE 35: CPT | Performed by: INTERNAL MEDICINE

## 2019-02-03 PROCEDURE — 82948 REAGENT STRIP/BLOOD GLUCOSE: CPT

## 2019-02-03 PROCEDURE — 85025 COMPLETE CBC W/AUTO DIFF WBC: CPT | Performed by: INTERNAL MEDICINE

## 2019-02-03 PROCEDURE — 80048 BASIC METABOLIC PNL TOTAL CA: CPT | Performed by: INTERNAL MEDICINE

## 2019-02-03 RX ORDER — DOXERCALCIFEROL 2 UG/ML
2 INJECTION, SOLUTION INTRAVENOUS 3 TIMES WEEKLY
Status: DISCONTINUED | OUTPATIENT
Start: 2019-02-05 | End: 2019-02-07 | Stop reason: HOSPADM

## 2019-02-03 RX ADMIN — FUROSEMIDE 80 MG: 10 INJECTION, SOLUTION INTRAMUSCULAR; INTRAVENOUS at 17:15

## 2019-02-03 RX ADMIN — AMLODIPINE BESYLATE 10 MG: 10 TABLET ORAL at 10:52

## 2019-02-03 RX ADMIN — Medication 1 CAPSULE: at 18:16

## 2019-02-03 RX ADMIN — HEPARIN SODIUM 5000 UNITS: 5000 INJECTION, SOLUTION INTRAVENOUS; SUBCUTANEOUS at 13:11

## 2019-02-03 RX ADMIN — FUROSEMIDE 80 MG: 10 INJECTION, SOLUTION INTRAMUSCULAR; INTRAVENOUS at 08:07

## 2019-02-03 RX ADMIN — INSULIN LISPRO 20 UNITS: 100 INJECTION, SOLUTION INTRAVENOUS; SUBCUTANEOUS at 08:07

## 2019-02-03 RX ADMIN — INSULIN GLARGINE 40 UNITS: 100 INJECTION, SOLUTION SUBCUTANEOUS at 21:19

## 2019-02-03 RX ADMIN — VARENICLINE TARTRATE 1 MG: 1 TABLET, FILM COATED ORAL at 08:05

## 2019-02-03 RX ADMIN — HEPARIN SODIUM 5000 UNITS: 5000 INJECTION, SOLUTION INTRAVENOUS; SUBCUTANEOUS at 05:47

## 2019-02-03 RX ADMIN — INSULIN LISPRO 4 UNITS: 100 INJECTION, SOLUTION INTRAVENOUS; SUBCUTANEOUS at 17:15

## 2019-02-03 RX ADMIN — INSULIN LISPRO 20 UNITS: 100 INJECTION, SOLUTION INTRAVENOUS; SUBCUTANEOUS at 18:17

## 2019-02-03 RX ADMIN — CALCIUM ACETATE 667 MG: 667 CAPSULE ORAL at 08:05

## 2019-02-03 RX ADMIN — HEPARIN SODIUM 5000 UNITS: 5000 INJECTION, SOLUTION INTRAVENOUS; SUBCUTANEOUS at 21:19

## 2019-02-03 RX ADMIN — INSULIN LISPRO 20 UNITS: 100 INJECTION, SOLUTION INTRAVENOUS; SUBCUTANEOUS at 13:39

## 2019-02-03 RX ADMIN — CALCIUM ACETATE 667 MG: 667 CAPSULE ORAL at 13:11

## 2019-02-03 RX ADMIN — CALCIUM ACETATE 667 MG: 667 CAPSULE ORAL at 18:16

## 2019-02-03 NOTE — PLAN OF CARE
CARDIOVASCULAR - ADULT     Maintains optimal cardiac output and hemodynamic stability Progressing        DISCHARGE PLANNING     Discharge to home or other facility with appropriate resources Progressing        HEMATOLOGIC - ADULT     Maintains hematologic stability Progressing        INFECTION - ADULT     Absence or prevention of progression during hospitalization Progressing     Absence of fever/infection during neutropenic period Progressing        Knowledge Deficit     Patient/family/caregiver demonstrates understanding of disease process, treatment plan, medications, and discharge instructions Progressing        METABOLIC, FLUID AND ELECTROLYTES - ADULT     Glucose maintained within target range Progressing        PAIN - ADULT     Verbalizes/displays adequate comfort level or baseline comfort level Progressing        Potential for Falls     Patient will remain free of falls Progressing        SAFETY ADULT     Patient will remain free of falls Progressing     Maintain or return to baseline ADL function Progressing     Maintain or return mobility status to optimal level Progressing

## 2019-02-03 NOTE — PROGRESS NOTES
NEPHROLOGY PROGRESS NOTE    Patient: Chris Bentley               Sex: male          DOA: 1/30/2019 11:45 PM   YOB: 1950        Age:  70 y o         LOS:  LOS: 3 days       HPI     Patient with ESRD started on dialysis on this admission    SUBJECTIVE     Feeling much better  Breathing is much better  Ambulating much better    No shortness of breath no chest pain no palpitation    CURRENT MEDICATIONS       Current Facility-Administered Medications:     acetaminophen (TYLENOL) tablet 650 mg, 650 mg, Oral, Q6H PRN, Cj Riley MD    amLODIPine (NORVASC) tablet 10 mg, 10 mg, Oral, Daily, Greg Fuchs PA-C, Stopped at 02/01/19 0932    b complex-vitamin C-folic acid (NEPHROCAPS) capsule 1 capsule, 1 capsule, Oral, Daily With Celso Christianson MD, 1 capsule at 02/02/19 1809    calcium acetate (PHOSLO) capsule 667 mg, 667 mg, Oral, TID With Meals, Evelyn Worthy MD, 667 mg at 02/03/19 0805    doxercalciferol (HECTOROL) injection 2 mcg, 2 mcg, Intravenous, Once per day on Mon Wed Fri, Evelyn Worthy MD, 2 mcg at 02/01/19 1554    furosemide (LASIX) injection 80 mg, 80 mg, Intravenous, BID (diuretic), Jennifer King PA-C, 80 mg at 02/03/19 0807    heparin (porcine) subcutaneous injection 5,000 Units, 5,000 Units, Subcutaneous, Q8H [address], 5,000 Units at 02/03/19 0547 **AND** Platelet count, , , Once, Greg Fuchs PA-C    influenza vaccine, high-dose (FLUZONE HIGH-DOSE) IM injection ANNAMARIE 0 5 mL, 0 5 mL, Intramuscular, Prior to discharge, Cj Riley MD    insulin glargine (LANTUS) subcutaneous injection 40 Units 0 4 mL, 40 Units, Subcutaneous, HS, Greg Fuchs PA-C, 40 Units at 02/02/19 2324    insulin lispro (HumaLOG) 100 units/mL subcutaneous injection 2-12 Units, 2-12 Units, Subcutaneous, TID AC, 6 Units at 02/02/19 1700 **AND** Fingerstick Glucose (POCT), , , TID AC, Greg Fuchs, DANIELLE    insulin lispro (HumaLOG) 100 units/mL subcutaneous injection 2-12 Units, 2-12 Units, Subcutaneous, HS, Greg Fuchs, DANIELLE, 8 Units at 01/31/19 2145    insulin lispro (HumaLOG) 100 units/mL subcutaneous injection 20 Units, 20 Units, Subcutaneous, TID With Meals, Greg Fuchs PA-C, 20 Units at 02/03/19 0807    ondansetron (ZOFRAN) injection 4 mg, 4 mg, Intravenous, Q8H PRN, Greg Fuchs PA-C    varenicline (CHANTIX) tablet 1 mg, 1 mg, Oral, BID, Greg Fuchs PA-C, 1 mg at 02/03/19 0805    OBJECTIVE     Current Weight: Weight - Scale: 113 kg (248 lb 3 8 oz)  Vitals:    02/03/19 0700   BP: 125/79   Pulse: 93   Resp: 18   Temp: 98 8 °F (37 1 °C)   SpO2: 93%       Intake/Output Summary (Last 24 hours) at 02/03/19 1033  Last data filed at 02/03/19 0901   Gross per 24 hour   Intake              880 ml   Output             3450 ml   Net            -2570 ml       PHYSICAL EXAMINATION     Physical Exam   Constitutional: He is oriented to person, place, and time  He appears well-developed  No distress  HENT:   Head: Normocephalic  Mouth/Throat: Oropharynx is clear and moist    Eyes: Conjunctivae are normal  No scleral icterus  Neck: Neck supple  No JVD present  Cardiovascular: Normal rate and normal heart sounds  Pulmonary/Chest: Effort normal  He has no wheezes  Abdominal: Soft  There is no tenderness  Musculoskeletal: Normal range of motion  He exhibits no edema  Neurological: He is alert and oriented to person, place, and time  Skin: Skin is warm  No rash noted  Psychiatric: He has a normal mood and affect   His behavior is normal         LAB RESULTS       Results from last 7 days  Lab Units 02/03/19  0616 02/02/19  0523 02/01/19  1402 01/31/19  1425 01/31/19  0557 01/31/19  0319 01/31/19  0010 01/31/19  0004   WBC Thousand/uL 12 66* 12 01* 11 87*  --  12 26*  --   --  11 92*   HEMOGLOBIN g/dL 11 8* 11 2* 10 9*  --  10 4*  --   --  11 4*   I STAT HEMOGLOBIN g/dl  --   --   --   --   --   --  11 9*  --    HEMATOCRIT % 37 1 35 4* 34 4*  --  33 5*  --   --  36 7   HEMATOCRIT, ISTAT %  --   --   --   --   --   --  35*  -- PLATELETS Thousands/uL 252 280 282  --  272 265  --  301   POTASSIUM mmol/L 3 6 3 8 4 2  --  4 7  --   --  4 5   CHLORIDE mmol/L 99* 102 101  --  103  --   --  102   CO2 mmol/L 31 27 26  --  22  --   --  22   CO2, I-STAT mmol/L  --   --   --   --   --   --  23  --    BUN mg/dL 34* 44* 66*  --  59*  --   --  58*   CREATININE mg/dL 4 16* 4 40* 5 56*  --  5 50*  --   --  5 50*   EGFR ml/min/1 73sq m 14 13 10  --  10  --  10 10   CALCIUM mg/dL 8 5 8 7 8 7  --  8 3  --   --  8 6   MAGNESIUM mg/dL  --   --   --   --  1 7  --   --   --    PHOSPHORUS mg/dL  --   --   --  4 5* 4 8*  --   --   --    GLUCOSE, ISTAT mg/dl  --   --   --   --   --   --  368*  --        RADIOLOGY RESULTS      Results for orders placed during the hospital encounter of 01/30/19   XR chest 1 view portable    Narrative CHEST     INDICATION:   heart failure  COMPARISON:  None    EXAM PERFORMED/VIEWS:  XR CHEST PORTABLE      FINDINGS:    Cardiac silhouette is enlarged  Distended central pulmonary vasculature  No airspace consolidation, pneumothorax, or large pleural effusion  Osseous structures appear within normal limits for patient age  Impression Cardiomegaly and central pulmonary vascular congestion  Findings concur with the preliminary report by the referring clinician already in PACS and/or our electronic record EPIC  Workstation performed: [id number]       No results found for this or any previous visit  No results found for this or any previous visit  No results found for this or any previous visit  No results found for this or any previous visit  No results found for this or any previous visit  PLAN / RECOMMENDATIONS      ESRD:  On intermittent dialysis started on this admission  Will continue to monitor  Will dialyze him back on Tuesday unless it is necessary to dialyze him tomorrow    Hypertension:  Very well control    Hyperparathyroidism:   On Hectorol which I will continue    Anemia: Stable    Disposition:  Waiting for outpatient dialysis placement    Evelyn Worthy MD  Nephrology  2/3/2019        Portions of the record may have been created with voice recognition software  Occasional wrong word or "sound a like" substitutions may have occurred due to the inherent limitations of voice recognition software  Read the chart carefully and recognize, using context, where substitutions have occurred

## 2019-02-03 NOTE — NURSING NOTE
Pt refused SCD's benefits of SCD's were discussed  Pt still refused  Signed refusal letter  Pt also refused compression stockings

## 2019-02-04 ENCOUNTER — APPOINTMENT (INPATIENT)
Dept: DIALYSIS | Facility: HOSPITAL | Age: 69
DRG: 673 | End: 2019-02-04
Payer: COMMERCIAL

## 2019-02-04 PROBLEM — Z99.2 ACUTE RENAL FAILURE SUPERIMPOSED ON CHRONIC KIDNEY DISEASE, ON CHRONIC DIALYSIS (HCC): Status: ACTIVE | Noted: 2019-01-31

## 2019-02-04 PROBLEM — N18.6 ESRD (END STAGE RENAL DISEASE) ON DIALYSIS (HCC): Status: ACTIVE | Noted: 2019-01-31

## 2019-02-04 LAB
GLUCOSE SERPL-MCNC: 148 MG/DL (ref 65–140)
GLUCOSE SERPL-MCNC: 160 MG/DL (ref 65–140)
GLUCOSE SERPL-MCNC: 220 MG/DL (ref 65–140)
GLUCOSE SERPL-MCNC: 284 MG/DL (ref 65–140)

## 2019-02-04 PROCEDURE — 5A1D70Z PERFORMANCE OF URINARY FILTRATION, INTERMITTENT, LESS THAN 6 HOURS PER DAY: ICD-10-PCS | Performed by: INTERNAL MEDICINE

## 2019-02-04 PROCEDURE — G0008 ADMIN INFLUENZA VIRUS VAC: HCPCS | Performed by: INTERNAL MEDICINE

## 2019-02-04 PROCEDURE — 82948 REAGENT STRIP/BLOOD GLUCOSE: CPT

## 2019-02-04 PROCEDURE — 90662 IIV NO PRSV INCREASED AG IM: CPT | Performed by: INTERNAL MEDICINE

## 2019-02-04 PROCEDURE — 90935 HEMODIALYSIS ONE EVALUATION: CPT | Performed by: INTERNAL MEDICINE

## 2019-02-04 PROCEDURE — 99232 SBSQ HOSP IP/OBS MODERATE 35: CPT | Performed by: INTERNAL MEDICINE

## 2019-02-04 RX ORDER — FUROSEMIDE 40 MG/1
40 TABLET ORAL
Status: DISCONTINUED | OUTPATIENT
Start: 2019-02-04 | End: 2019-02-07 | Stop reason: HOSPADM

## 2019-02-04 RX ADMIN — Medication 1 CAPSULE: at 18:51

## 2019-02-04 RX ADMIN — INFLUENZA A VIRUS A/MICHIGAN/45/2015 X-275 (H1N1) ANTIGEN (FORMALDEHYDE INACTIVATED), INFLUENZA A VIRUS A/SINGAPORE/INFIMH-16-0019/2016 IVR-186 (H3N2) ANTIGEN (FORMALDEHYDE INACTIVATED), AND INFLUENZA B VIRUS B/MARYLAND/15/2016 BX-69A (A B/COLORADO/6/2017-LIKE VIRUS) ANTIGEN (FORMALDEHYDE INACTIVATED) 0.5 ML: 60; 60; 60 INJECTION, SUSPENSION INTRAMUSCULAR at 15:23

## 2019-02-04 RX ADMIN — FUROSEMIDE 80 MG: 10 INJECTION, SOLUTION INTRAMUSCULAR; INTRAVENOUS at 12:48

## 2019-02-04 RX ADMIN — FUROSEMIDE 40 MG: 40 TABLET ORAL at 15:23

## 2019-02-04 RX ADMIN — HEPARIN SODIUM 5000 UNITS: 5000 INJECTION, SOLUTION INTRAVENOUS; SUBCUTANEOUS at 15:22

## 2019-02-04 RX ADMIN — CALCIUM ACETATE 667 MG: 667 CAPSULE ORAL at 12:48

## 2019-02-04 RX ADMIN — INSULIN GLARGINE 40 UNITS: 100 INJECTION, SOLUTION SUBCUTANEOUS at 22:05

## 2019-02-04 RX ADMIN — INSULIN LISPRO 20 UNITS: 100 INJECTION, SOLUTION INTRAVENOUS; SUBCUTANEOUS at 12:50

## 2019-02-04 RX ADMIN — VARENICLINE TARTRATE 1 MG: 1 TABLET, FILM COATED ORAL at 18:51

## 2019-02-04 RX ADMIN — INSULIN LISPRO 6 UNITS: 100 INJECTION, SOLUTION INTRAVENOUS; SUBCUTANEOUS at 18:52

## 2019-02-04 RX ADMIN — AMLODIPINE BESYLATE 10 MG: 10 TABLET ORAL at 12:48

## 2019-02-04 RX ADMIN — INSULIN LISPRO 4 UNITS: 100 INJECTION, SOLUTION INTRAVENOUS; SUBCUTANEOUS at 12:50

## 2019-02-04 RX ADMIN — INSULIN LISPRO 20 UNITS: 100 INJECTION, SOLUTION INTRAVENOUS; SUBCUTANEOUS at 18:52

## 2019-02-04 RX ADMIN — HEPARIN SODIUM 5000 UNITS: 5000 INJECTION, SOLUTION INTRAVENOUS; SUBCUTANEOUS at 05:51

## 2019-02-04 RX ADMIN — CALCIUM ACETATE 667 MG: 667 CAPSULE ORAL at 18:51

## 2019-02-04 RX ADMIN — HEPARIN SODIUM 5000 UNITS: 5000 INJECTION, SOLUTION INTRAVENOUS; SUBCUTANEOUS at 22:04

## 2019-02-04 RX ADMIN — INSULIN LISPRO 2 UNITS: 100 INJECTION, SOLUTION INTRAVENOUS; SUBCUTANEOUS at 22:04

## 2019-02-04 NOTE — UTILIZATION REVIEW
Continued Stay Review    Date: 2/4/19    Hudson Linares is a 70year old male with a history of CHF, DM,  CKD Stage 5 S/P left nephrectomy in 2002, who was admitted through the ED on 1/31 for progressive SOB  Patient presented with O2 sat  of 88% on room air, cardiomegaly and vascular congestion on x-ray, B/L LE edema and rales  BUN/Creatinine of 58/5 5, unknown baseline  Patient was placed on Lasix 80 mg BID, and diuresed approximately 2 7 L overnight  AV fistula was created 2 weeks ago in anticipation of dialysis, however, patient required dialysis during this admission and a catheter was placed on 1/31  Patient received hemodialysis on 2/1, 2/2 and 2/4  Vital Signs: /77 (BP Location: Right arm)   Pulse 90   Temp 98 5 °F (36 9 °C) (Oral)   Resp 18   Ht 5' 7" (1 702 m)   Wt 113 kg (248 lb 0 3 oz)   SpO2 95%   BMI 38 85 kg/m²      Assessment/Plan:     2/4 Internal Medicine Progress Note:    · 1  Acute on chronic renal failure- Patient on dialysis  Will f/u nephrology recommendations  · 2  SOB- mostlikely secondary to #1  Cardiology following  Patient 2d echo EF 65%  · 3  History of left renal cell carcinoma s/p nephrectomy 2002  · 4  DM- on lantus and ISS      1/31 Nephrology Consult:    1  ESRD:  Patient has uremic symptoms including fatigue, excessive sleepiness and decreased appetite  X-ray finding of vascular congestion is likely related to progression of CKD as well  Will have tunnel cuffed catheter placement later today and start renal replacement therapy from tomorrow         2  Shortness of breath:  Likely due to volume overload from progressive kidney disease  We will keep on Lasix 80 mg twice a day      3  Bone mineral disorder:  Will check PTH intact, 25 hydroxy vitamin-D, calcium and phosphorus levels  Patient is noted to be on calcitriol and calcium acetate as well      4  Anemia of chronic disease:  Hemoglobin is 10 4 in acceptable range will perform iron studies      5  Diabetes mellitus:  Patient is on standing Lantus as well as sliding scale      6  Access:  Patient has a left radiocephalic fistula which is not mature yet  Therefore will place a tunnel cuffed catheter in this patient        Medications:   Scheduled Meds:   Current Facility-Administered Medications:  acetaminophen 650 mg Oral Q6H PRN   amLODIPine 10 mg Oral Daily   b complex-vitamin C-folic acid 1 capsule Oral Daily With Dinner   calcium acetate 667 mg Oral TID With Meals   [START ON 2/5/2019] doxercalciferol 2 mcg Intravenous Once per day on Tue Thu Sat   furosemide 80 mg Intravenous BID (diuretic)   heparin (porcine) 5,000 Units Subcutaneous Q8H [address]   influenza vaccine 0 5 mL Intramuscular Prior to discharge   insulin glargine 40 Units Subcutaneous HS   insulin lispro 2-12 Units Subcutaneous TID AC   insulin lispro 2-12 Units Subcutaneous HS   insulin lispro 20 Units Subcutaneous TID With Meals   ondansetron 4 mg Intravenous Q8H PRN   varenicline 1 mg Oral BID     Pertinent Labs/Diagnostic Results:     02/03/19 0616     BUN 5 - 25 mg/dL 34     Creatinine 0 60 - 1 30 mg/dL 4 16           02/03/19 0616    WBC 4 31 - 10 16 Thousand/uL 12 66     Hemoglobin 12 0 - 17 0 g/dL 11 8     Hematocrit 36 5 - 49 3 % 37 1         02/03/19 0616     Neutrophils Absolute 1 85 - 7 62 Thousands/µL 8 08         Age/Sex: 70 y o  male     Discharge Plan: TBD (Case Management referral for Outpatient dialysis placed on 2/1)

## 2019-02-04 NOTE — PROGRESS NOTES
[facility] Internal Medicine Progress Note  Patient: Janessa Riggs 70 y o  male   MRN: [id number]  PCP: Candice Fuentes MD  Unit/Bed#: -[place] Encounter: [id number]  Date Of Visit: 19    Assessment:    Principal Problem:    ESRD (end stage renal disease) on dialysis [facility])  Active Problems:    Other hyperlipidemia    Type 2 diabetes mellitus with hyperglycemia ([facility] [address] )    Essential hypertension    Acute on chronic congestive heart failure ([facility] [address] )      Plan:    · 1  Acute on chronic renal failure- Patient on dialysis  Will f/u nephrology recommendations  · 2  SOB- mostlikely secondary to #1  Cardiology following  Patient 2d echo EF 65%  · 3  History of left renal cell carcinoma s/p nephrectomy   · 4  DM- on lantus and ISS  · 5  Smoking- on chantix  · 6  Dispo- awaiting for outpatient dialysis placement before discharge  ·        VTE Pharmacologic Prophylaxis:   Pharmacologic: Heparin  Mechanical VTE Prophylaxis in Place: Yes    Patient Centered Rounds: I have performed bedside rounds with nursing staff today  Discussions with Specialists or Other Care Team Provider:     Education and Discussions with Family / Patient:     Time Spent for Care: 20 minutes  More than 50% of total time spent on counseling and coordination of care as described above  Current Length of Stay: 4 day(s)    Current Patient Status: Inpatient   Certification Statement: The patient will continue to require additional inpatient hospital stay due to ESRD on HD    Discharge Plan / Estimated Discharge Date: once above issues resolve      Code Status: Level 1 - Full Code      Subjective:   Patient seen and examined at bedside  Patient has no new complaints      Objective:     Vitals:   Temp (24hrs), Av 7 °F (37 1 °C), Min:98 5 °F (36 9 °C), Max:99 °F (37 2 °C)    Temp:  [98 5 °F (36 9 °C)-99 °F (37 2 °C)] 98 5 °F (36 9 °C)  HR:  [84-98] 89  Resp:  [16-18] 18  BP: (116-148)/(60-86) 136/78  SpO2:  [92 %-95 %] 95 %  Body mass index is 38 85 kg/m²  Input and Output Summary (last 24 hours): Intake/Output Summary (Last 24 hours) at 02/04/19 1050  Last data filed at 02/04/19 2823   Gross per 24 hour   Intake             1190 ml   Output             1100 ml   Net               90 ml       Physical Exam:     Physical Exam   Constitutional: He is oriented to person, place, and time  He appears well-developed and well-nourished  HENT:   Head: Normocephalic and atraumatic  Eyes: Pupils are equal, round, and reactive to light  Conjunctivae and EOM are normal    Neck: Normal range of motion  Neck supple  No JVD present  No tracheal deviation present  No thyromegaly present  Cardiovascular: Normal rate, regular rhythm and normal heart sounds  Exam reveals no gallop and no friction rub  No murmur heard  Pulmonary/Chest: Effort normal and breath sounds normal  No respiratory distress  He has no wheezes  He has no rales  Abdominal: Soft  Bowel sounds are normal  He exhibits no distension  There is no tenderness  There is no rebound  Musculoskeletal: He exhibits edema  Neurological: He is alert and oriented to person, place, and time  Skin: Skin is warm and dry  No rash noted  No erythema             Additional Data:     Labs:      Results from last 7 days  Lab Units 02/03/19  0616   WBC Thousand/uL 12 66*   HEMOGLOBIN g/dL 11 8*   HEMATOCRIT % 37 1   PLATELETS Thousands/uL 252   NEUTROS PCT % 63   LYMPHS PCT % 21   MONOS PCT % 9   EOS PCT % 5       Results from last 7 days  Lab Units 02/03/19  0616  01/31/19  0557 01/31/19  0010   POTASSIUM mmol/L 3 6  < > 4 7  --    CHLORIDE mmol/L 99*  < > 103  --    CO2 mmol/L 31  < > 22  --    CO2, I-STAT mmol/L  --   --   --  23   BUN mg/dL 34*  < > 59*  --    CREATININE mg/dL 4 16*  < > 5 50*  --    CALCIUM mg/dL 8 5  < > 8 3  --    ALK PHOS U/L  --   --  91  --    ALT U/L  --   --  10*  --    AST U/L  --   --  9  --    GLUCOSE, ISTAT mg/dl  --   --   --  368*   < > = values in this interval not displayed  Results from last 7 days  Lab Units 01/31/19  0557   INR  1 12       * I Have Reviewed All Lab Data Listed Above  * Additional Pertinent Lab Tests Reviewed: [facility] Admission Reviewed    Imaging:    Imaging Reports Reviewed Today Include:   Imaging Personally Reviewed by Myself Includes:      Recent Cultures (last 7 days):           Last 24 Hours Medication List:     Current Facility-Administered Medications:  acetaminophen 650 mg Oral Q6H PRN Ruby Alexander MD   amLODIPine 10 mg Oral Daily Greg Fuchs PA-C   b complex-vitamin C-folic acid 1 capsule Oral Daily With Candace Castillo MD   calcium acetate 667 mg Oral TID With Meals Nichole Pickett MD   Sara Pleva ON 2/5/2019] doxercalciferol 2 mcg Intravenous Once per day on Tue Thu Sat Nichole Pickett MD   furosemide 80 mg Intravenous BID (diuretic) Saba Pina PA-C   heparin (porcine) 5,000 Units Subcutaneous Q8H [facility] Sharyn Rodgers PA-C   influenza vaccine 0 5 mL Intramuscular Prior to discharge Ruby Alexander MD   insulin glargine 40 Units Subcutaneous HS Greg Fuchs PA-C   insulin lispro 2-12 Units Subcutaneous TID AC Greg Fuchs PA-C   insulin lispro 2-12 Units Subcutaneous HS Greg Fuchs PA-C   insulin lispro 20 Units Subcutaneous TID With Meals Greg Fuchs PA-C   ondansetron 4 mg Intravenous Q8H PRN Greg Fuchs PA-C   varenicline 1 mg Oral BID Greg Fuchs PA-C        Today, Patient Was Seen By: Ruby Alexander MD    ** Please Note: This note has been constructed using a voice recognition system   **

## 2019-02-04 NOTE — PROGRESS NOTES
HEMODIALYSIS ROUNDING NOTE    Patient: Bethany Justin               Sex: male          DOA: 1/30/2019 11:45 PM   YOB: 1950        Age:  70 y o         LOS:  LOS: 4 days             SUBJECTIVE     - Patient was seen during hemodialysis today  - Reviewed last 24 hrs events     No acute complaint  Wants to go home    Eating and drinking well    CURRENT MEDICATIONS       Current Facility-Administered Medications:     acetaminophen (TYLENOL) tablet 650 mg, 650 mg, Oral, Q6H PRN, Bev Cool MD    amLODIPine (NORVASC) tablet 10 mg, 10 mg, Oral, Daily, Greg Fuchs PA-C, 10 mg at 02/03/19 1052    b complex-vitamin C-folic acid (NEPHROCAPS) capsule 1 capsule, 1 capsule, Oral, Daily With Meche Rodriguez MD, 1 capsule at 02/03/19 1816    calcium acetate (PHOSLO) capsule 667 mg, 667 mg, Oral, TID With Meals, Mark Gurrola MD, 667 mg at 02/03/19 1816    [START ON 2/5/2019] doxercalciferol (HECTOROL) injection 2 mcg, 2 mcg, Intravenous, Once per day on Tue Thu Sat, Mark Gurrola MD    furosemide (LASIX) injection 80 mg, 80 mg, Intravenous, BID (diuretic), Jennifer King PA-C, 80 mg at 02/03/19 1715    heparin (porcine) subcutaneous injection 5,000 Units, 5,000 Units, Subcutaneous, Q8H [address], 5,000 Units at 02/04/19 0551 **AND** Platelet count, , , Once, Greg Fuchs PA-C    influenza vaccine, high-dose (FLUZONE HIGH-DOSE) IM injection ANNAMARIE 0 5 mL, 0 5 mL, Intramuscular, Prior to discharge, Bev Cool MD    insulin glargine (LANTUS) subcutaneous injection 40 Units 0 4 mL, 40 Units, Subcutaneous, HS, Greg Fuchs PA-C, 40 Units at 02/03/19 2119    insulin lispro (HumaLOG) 100 units/mL subcutaneous injection 2-12 Units, 2-12 Units, Subcutaneous, TID AC, 4 Units at 02/03/19 1715 **AND** Fingerstick Glucose (POCT), , , TID AC, Greg Fuchs PA-C    insulin lispro (HumaLOG) 100 units/mL subcutaneous injection 2-12 Units, 2-12 Units, Subcutaneous, HS, Greg Fuchs PA-C, 8 Units at 01/31/19 0684    insulin lispro (HumaLOG) 100 units/mL subcutaneous injection 20 Units, 20 Units, Subcutaneous, TID With Meals, Greg Fuchs PA-C, 20 Units at 02/03/19 1817    ondansetron (ZOFRAN) injection 4 mg, 4 mg, Intravenous, Q8H PRN, Greg Fuchs PA-C    varenicline (CHANTIX) tablet 1 mg, 1 mg, Oral, BID, Greg Fuchs PA-C, 1 mg at 02/03/19 0805    OBJECTIVE     Current Weight: Weight - Scale: 113 kg (248 lb 0 3 oz)  Vitals:    02/04/19 0930   BP: 142/72   Pulse:    Resp:    Temp:    SpO2:        Intake/Output Summary (Last 24 hours) at 02/04/19 1017  Last data filed at 02/04/19 7752   Gross per 24 hour   Intake             1190 ml   Output             1100 ml   Net               90 ml       PHYSICAL EXAMINATION     Physical Exam   Constitutional: He is oriented to person, place, and time  He appears well-developed  No distress  HENT:   Head: Normocephalic  Mouth/Throat: Oropharynx is clear and moist    Eyes: Conjunctivae are normal  No scleral icterus  Neck: Neck supple  No JVD present  Cardiovascular: Normal rate and normal heart sounds  Pulmonary/Chest: Effort normal  He has no wheezes  Abdominal: Soft  There is no tenderness  Musculoskeletal: Normal range of motion  He exhibits no edema  Neurological: He is alert and oriented to person, place, and time  Skin: Skin is warm  No rash noted  Psychiatric: He has a normal mood and affect   His behavior is normal          LAB RESULTS       Results from last 7 days  Lab Units 02/03/19  0616 02/02/19  0523 02/01/19  1402 01/31/19  1425 01/31/19  0557 01/31/19  0319 01/31/19  0010 01/31/19  0004   WBC Thousand/uL 12 66* 12 01* 11 87*  --  12 26*  --   --  11 92*   HEMOGLOBIN g/dL 11 8* 11 2* 10 9*  --  10 4*  --   --  11 4*   I STAT HEMOGLOBIN g/dl  --   --   --   --   --   --  11 9*  --    HEMATOCRIT % 37 1 35 4* 34 4*  --  33 5*  --   --  36 7   HEMATOCRIT, ISTAT %  --   --   --   --   --   --  35*  --    PLATELETS Thousands/uL 252 280 282  --  272 265  --  301 POTASSIUM mmol/L 3 6 3 8 4 2  --  4 7  --   --  4 5   CHLORIDE mmol/L 99* 102 101  --  103  --   --  102   CO2 mmol/L 31 27 26  --  22  --   --  22   CO2, I-STAT mmol/L  --   --   --   --   --   --  23  --    BUN mg/dL 34* 44* 66*  --  59*  --   --  58*   CREATININE mg/dL 4 16* 4 40* 5 56*  --  5 50*  --   --  5 50*   EGFR ml/min/1 73sq m 14 13 10  --  10  --  10 10   CALCIUM mg/dL 8 5 8 7 8 7  --  8 3  --   --  8 6   MAGNESIUM mg/dL  --   --   --   --  1 7  --   --   --    PHOSPHORUS mg/dL  --   --   --  4 5* 4 8*  --   --   --    GLUCOSE, ISTAT mg/dl  --   --   --   --   --   --  368*  --        RADIOLOGY RESULTS     Results for orders placed during the hospital encounter of 01/30/19   XR chest 1 view portable    Narrative CHEST     INDICATION:   heart failure  COMPARISON:  None    EXAM PERFORMED/VIEWS:  XR CHEST PORTABLE      FINDINGS:    Cardiac silhouette is enlarged  Distended central pulmonary vasculature  No airspace consolidation, pneumothorax, or large pleural effusion  Osseous structures appear within normal limits for patient age  Impression Cardiomegaly and central pulmonary vascular congestion  Findings concur with the preliminary report by the referring clinician already in PACS and/or our electronic record EPIC  Workstation performed: [id number]       No results found for this or any previous visit  No results found for this or any previous visit  No results found for this or any previous visit  No results found for this or any previous visit  No results found for this or any previous visit  PLAN / RECOMMENDATIONS     1  End Stage Renal Disease:       I saw and examined patient during hemodialysis treatment  The patient was receiving hemodialysis for treatment of end stage renal disease  I have also reviewed vital signs, intake and output, lab results and recent events, and agreed with today's dialysis order  Access:  has dialysis catheter    Fistula is done but not ready to be used      2  Anemia:  Will continue with Procrit    3  Disposition:  Need outpatient dialysis placement before discharge         Evelyn Worthy MD  Nephrology  2/4/2019        Portions of the record may have been created with voice recognition software  Occasional wrong word or "sound a like" substitutions may have occurred due to the inherent limitations of voice recognition software  Read the chart carefully and recognize, using context, where substitutions have occurred

## 2019-02-04 NOTE — OCCUPATIONAL THERAPY NOTE
Occupational Therapy Cancellation Note        Patient Name: Francisco Harry  Today's Date: 2/4/2019      OT orders received  Chart received performed  When OT spoke with pt's nurse, they reported pt at HD at this time  OT will continue to follow and reattempt when appropriate      Zohreh Emerson MS OTR/L

## 2019-02-04 NOTE — HEMODIALYSIS
HD completed without evidence of distress to Pt  Lines had to be reversed for last 45 min of treatment      UF 2 3 Kg

## 2019-02-05 ENCOUNTER — APPOINTMENT (INPATIENT)
Dept: DIALYSIS | Facility: HOSPITAL | Age: 69
DRG: 673 | End: 2019-02-05
Attending: INTERNAL MEDICINE
Payer: COMMERCIAL

## 2019-02-05 LAB
ANION GAP SERPL CALCULATED.3IONS-SCNC: 11 MMOL/L (ref 4–13)
BASOPHILS # BLD AUTO: 0.07 THOUSANDS/ΜL (ref 0–0.1)
BASOPHILS NFR BLD AUTO: 1 % (ref 0–1)
BUN SERPL-MCNC: 37 MG/DL (ref 5–25)
CALCIUM SERPL-MCNC: 8.6 MG/DL (ref 8.3–10.1)
CHLORIDE SERPL-SCNC: 98 MMOL/L (ref 100–108)
CO2 SERPL-SCNC: 28 MMOL/L (ref 21–32)
CREAT SERPL-MCNC: 4.66 MG/DL (ref 0.6–1.3)
EOSINOPHIL # BLD AUTO: 0.64 THOUSAND/ΜL (ref 0–0.61)
EOSINOPHIL NFR BLD AUTO: 5 % (ref 0–6)
ERYTHROCYTE [DISTWIDTH] IN BLOOD BY AUTOMATED COUNT: 13.7 % (ref 11.6–15.1)
GFR SERPL CREATININE-BSD FRML MDRD: 12 ML/MIN/1.73SQ M
GLUCOSE SERPL-MCNC: 102 MG/DL (ref 65–140)
GLUCOSE SERPL-MCNC: 123 MG/DL (ref 65–140)
GLUCOSE SERPL-MCNC: 162 MG/DL (ref 65–140)
GLUCOSE SERPL-MCNC: 222 MG/DL (ref 65–140)
GLUCOSE SERPL-MCNC: 91 MG/DL (ref 65–140)
HCT VFR BLD AUTO: 37.2 % (ref 36.5–49.3)
HGB BLD-MCNC: 11.5 G/DL (ref 12–17)
IMM GRANULOCYTES # BLD AUTO: 0.05 THOUSAND/UL (ref 0–0.2)
IMM GRANULOCYTES NFR BLD AUTO: 0 % (ref 0–2)
LYMPHOCYTES # BLD AUTO: 2.37 THOUSANDS/ΜL (ref 0.6–4.47)
LYMPHOCYTES NFR BLD AUTO: 19 % (ref 14–44)
MCH RBC QN AUTO: 28.4 PG (ref 26.8–34.3)
MCHC RBC AUTO-ENTMCNC: 30.9 G/DL (ref 31.4–37.4)
MCV RBC AUTO: 92 FL (ref 82–98)
MONOCYTES # BLD AUTO: 0.9 THOUSAND/ΜL (ref 0.17–1.22)
MONOCYTES NFR BLD AUTO: 7 % (ref 4–12)
NEUTROPHILS # BLD AUTO: 8.34 THOUSANDS/ΜL (ref 1.85–7.62)
NEUTS SEG NFR BLD AUTO: 68 % (ref 43–75)
NRBC BLD AUTO-RTO: 0 /100 WBCS
PLATELET # BLD AUTO: 272 THOUSANDS/UL (ref 149–390)
PMV BLD AUTO: 10.4 FL (ref 8.9–12.7)
POTASSIUM SERPL-SCNC: 4 MMOL/L (ref 3.5–5.3)
RBC # BLD AUTO: 4.05 MILLION/UL (ref 3.88–5.62)
SODIUM SERPL-SCNC: 137 MMOL/L (ref 136–145)
WBC # BLD AUTO: 12.37 THOUSAND/UL (ref 4.31–10.16)

## 2019-02-05 PROCEDURE — 82948 REAGENT STRIP/BLOOD GLUCOSE: CPT

## 2019-02-05 PROCEDURE — 90935 HEMODIALYSIS ONE EVALUATION: CPT | Performed by: INTERNAL MEDICINE

## 2019-02-05 PROCEDURE — 80048 BASIC METABOLIC PNL TOTAL CA: CPT | Performed by: INTERNAL MEDICINE

## 2019-02-05 PROCEDURE — 99233 SBSQ HOSP IP/OBS HIGH 50: CPT | Performed by: GENERAL PRACTICE

## 2019-02-05 PROCEDURE — 85025 COMPLETE CBC W/AUTO DIFF WBC: CPT | Performed by: INTERNAL MEDICINE

## 2019-02-05 PROCEDURE — 97166 OT EVAL MOD COMPLEX 45 MIN: CPT

## 2019-02-05 RX ADMIN — HEPARIN SODIUM 5000 UNITS: 5000 INJECTION, SOLUTION INTRAVENOUS; SUBCUTANEOUS at 16:19

## 2019-02-05 RX ADMIN — Medication 1 CAPSULE: at 16:19

## 2019-02-05 RX ADMIN — INSULIN GLARGINE 40 UNITS: 100 INJECTION, SOLUTION SUBCUTANEOUS at 21:28

## 2019-02-05 RX ADMIN — VARENICLINE TARTRATE 1 MG: 1 TABLET, FILM COATED ORAL at 18:29

## 2019-02-05 RX ADMIN — INSULIN LISPRO 20 UNITS: 100 INJECTION, SOLUTION INTRAVENOUS; SUBCUTANEOUS at 08:30

## 2019-02-05 RX ADMIN — FUROSEMIDE 40 MG: 40 TABLET ORAL at 08:30

## 2019-02-05 RX ADMIN — CALCIUM ACETATE 667 MG: 667 CAPSULE ORAL at 14:08

## 2019-02-05 RX ADMIN — CALCIUM ACETATE 667 MG: 667 CAPSULE ORAL at 16:19

## 2019-02-05 RX ADMIN — INSULIN LISPRO 20 UNITS: 100 INJECTION, SOLUTION INTRAVENOUS; SUBCUTANEOUS at 14:09

## 2019-02-05 RX ADMIN — FUROSEMIDE 40 MG: 40 TABLET ORAL at 16:19

## 2019-02-05 RX ADMIN — INSULIN LISPRO 20 UNITS: 100 INJECTION, SOLUTION INTRAVENOUS; SUBCUTANEOUS at 16:21

## 2019-02-05 RX ADMIN — INSULIN LISPRO 2 UNITS: 100 INJECTION, SOLUTION INTRAVENOUS; SUBCUTANEOUS at 21:27

## 2019-02-05 RX ADMIN — HEPARIN SODIUM 5000 UNITS: 5000 INJECTION, SOLUTION INTRAVENOUS; SUBCUTANEOUS at 21:27

## 2019-02-05 RX ADMIN — CALCIUM ACETATE 667 MG: 667 CAPSULE ORAL at 08:30

## 2019-02-05 RX ADMIN — AMLODIPINE BESYLATE 10 MG: 10 TABLET ORAL at 16:19

## 2019-02-05 RX ADMIN — INSULIN LISPRO 2 UNITS: 100 INJECTION, SOLUTION INTRAVENOUS; SUBCUTANEOUS at 16:21

## 2019-02-05 RX ADMIN — DOXERCALCIFEROL 2 MCG: 4 INJECTION, SOLUTION INTRAVENOUS at 08:25

## 2019-02-05 RX ADMIN — HEPARIN SODIUM 5000 UNITS: 5000 INJECTION, SOLUTION INTRAVENOUS; SUBCUTANEOUS at 05:44

## 2019-02-05 NOTE — ASSESSMENT & PLAN NOTE
Glucose level at 370  -continue Lantus 40 units subcu HS  -continue Humalog 20 units subcu t i d   With meals  -sliding scale insulin coverage  -fingerstick glucose 4 times daily AC/HS

## 2019-02-05 NOTE — HEMODIALYSIS
Pt received 3 1/2 hd tx today  Able to maintain BFR, VS stable throughout tx, removed 2kg without difficulty

## 2019-02-05 NOTE — PLAN OF CARE
Problem: OCCUPATIONAL THERAPY ADULT  Goal: Performs self-care activities at highest level of function for planned discharge setting  See evaluation for individualized goals  Treatment Interventions: ADL retraining, UE strengthening/ROM, Functional transfer training, Activityengagement, Energy conservation, Cardiac education, Continued evaluation, Equipment evaluation/education, Compensatory technique education, Patient/family training          See flowsheet documentation for full assessment, interventions and recommendations  Limitation: Decreased ADL status, Decreased self-care trans, Decreased high-level ADLs, Decreased endurance, Decreased UE strength  Prognosis: Good  Assessment: Pt is a 70 y o  male seen for OT evaluation s/p admit to [facility] on 1/30/2019 w/ ESRD (end stage renal disease) on dialysis ([facility] [address] )  Comorbidities affecting pt's functional performance at time of assessment include:CHF, DM and HTN   Orders placed for OT evaluation and treatment and "up and oob as peggy" order  Performed at least two patient identifiers during session including name and wristband  Personal factors affecting pt at time of IE include:limited home support, behavioral pattern, difficulty performing ADLS, difficulty performing IADLS , decreased initiation and engagement , financial barriers, health management  and environment  Prior to admission, pt reports Ind with ADLs, Ind with IADLs, and (+) driving  Upon evaluation: Pt requires S with UB ADLs, S with LB ADLs, S with xfers and S with functional mobility 2* the following deficits impacting occupational performance: weakness, decreased balance, decreased tolerance, decreased mobilty and environmental deficits  Pt to benefit from continued skilled OT tx while in the hospital to address deficits as defined above and maximize level of functional independence w ADL's and functional mobility   Occupational Performance areas to address include: bathing/shower, medication management, health maintenance, functional mobility, community mobility, clothing management and household maintenance  From OT standpoint, recommendation at time of d/c would be home with out pt vs home with OT  OT Discharge Recommendation: Other (Comment) (out pt OT vs home OT (pending comfort level driving))  OT - OK to Discharge:  Yes

## 2019-02-05 NOTE — OCCUPATIONAL THERAPY NOTE
Occupational Therapy Evaluation      Shandatu Tesfayeler    2/5/2019    Patient Active Problem List   Diagnosis    Other hyperlipidemia    Type 2 diabetes mellitus with hyperglycemia ([facility] [address] )    Essential hypertension    [facility]    Acute on chronic congestive heart failure ([facility] [address] )       Past Medical History:   Diagnosis Date    Cancer [facility]     kidney     CHF (congestive heart failure) ([facility] [address] [facility]     Diabetes mellitus [facility]     Hyperlipidemia     Hypertension     Renal disorder        Past Surgical History:   Procedure Laterality Date    [facility] PL[facility]  1/31/2019    NEPHRECTOMY Right     PLACEMENT PROTHESIS PENILE        02/05/19 1231   Note Type   Note type Eval/Treat   Restrictions/Precautions   Weight Bearing Precautions Per Order No   Braces or Orthoses (none per pt)   Other Precautions Chair Alarm; Bed Alarm;Telemetry; Fall Risk;Limb alert   Pain Assessment   Pain Assessment No/denies pain   Pain Score No Pain   Home Living   Type of Home House   Home Layout Two level; Able to live on main level with bedroom/bathroom; Performs ADLs on one level  (1st floor set up, 0 DENITA)   Bathroom Shower/Tub Tub/shower unit   [employer] Raised   Bathroom Equipment Grab bars in shower; Shower chair   [address] Walker;Crutches  (no AD used at baseline)   Prior Function   Level of Starr Independent with ADLs and functional mobility   Lives With Alone   Receives Help From Family;Friend(s)  (niece lives upstairs (roommate))   ADL Assistance Independent   IADLs Independent   Falls in the last 6 months 0  ((+) fall history)   Vocational Retired  ()   Comments (+) driving   Lifestyle   Autonomy Pt reports PlOF was MI with ADLS, IADLS, and driving   Reciprocal Relationships family   Psychosocial   Psychosocial (WDL) WDL   ADL   Eating Assistance 5  Supervision/Setup   Eating Deficit Increased time to complete;Supervision/safety Grooming Assistance 5  Supervision/Setup   Grooming Deficit Supervision/safety; Increased time to complete   UB Bathing Assistance 5  Supervision/Setup   UB Bathing Deficit Increased time to complete;Supervision/safety   LB Bathing Assistance 5  Supervision/Setup   LB Bathing Deficit Supervision/safety; Increased time to complete   UB Dressing Assistance 5  Supervision/Setup   UB Dressing Deficit Increased time to complete;Supervision/safety   LB Dressing Assistance 5  Supervision/Setup   LB Dressing Deficit Supervision/safety; Increased time to complete   Toileting Assistance  5  Supervision/Setup   Toileting Deficit Increased time to complete;Supervison/safety   Functional Assistance 5  Supervision/Setup   Functional Deficit Supervision/safety; Increased time to complete   Bed Mobility   Rolling R Unable to assess   Additional Comments pt received OOB in recliner upon arrival   Transfers   Sit to Stand 5  Supervision   Additional items Assist x 1; Armrests; Increased time required;Verbal cues   Stand to Sit 5  Supervision   Additional items Assist x 1; Armrests; Increased time required;Verbal cues   Balance   Static Sitting Good   Dynamic Sitting Fair +   Static Standing Fair   Dynamic Standing Fair -   Ambulatory Fair -   Activity Tolerance   Activity Tolerance Patient limited by fatigue   Nurse Made Aware Yes, spoke with RN, Jewel Mayer, who stated pt was appropriate for OT and made aware of outcomes   RUE Assessment   RUE Assessment WFL  (AROM WFL)   LUE Assessment   LUE Assessment WFL  (AROM WFL)   Hand Function   Gross Motor Coordination Functional   Fine Motor Coordination Functional   Sensation   Light Touch No apparent deficits   Sharp/Dull No apparent deficits   Stereognosis No apparent deficits   Proprioception   Proprioception No apparent deficits   Vision-Basic Assessment   Current Vision Wears glasses only for reading   Vision - Complex Assessment   Ocular Range of Motion [facility]   Perception   Inattention/Neglect Appears intact   Cognition   Overall Cognitive Status WFL   Arousal/Participation Alert;Arousable; Cooperative   Attention Within functional limits   Orientation Level Oriented X4   Memory Within functional limits   Following Commands Follows all commands and directions without difficulty   Comments Pt was agreeable to OT eval   Assessment   Limitation Decreased ADL status; Decreased self-care trans;Decreased high-level ADLs; Decreased endurance;Decreased UE strength   Prognosis Good   Assessment Pt is a 70 y o  male seen for OT evaluation s/p admit to [facility] on 1/30/2019 w/ ESRD (end stage renal disease) on dialysis ([facility] [address] )  Comorbidities affecting pt's functional performance at time of assessment include:CHF, DM and HTN   Orders placed for OT evaluation and treatment and "up and oob as peggy" order  Performed at least two patient identifiers during session including name and wristband  Personal factors affecting pt at time of IE include:limited home support, behavioral pattern, difficulty performing ADLS, difficulty performing IADLS , decreased initiation and engagement , financial barriers, health management  and environment  Prior to admission, pt reports Ind with ADLs, Ind with IADLs, and (+) driving  Upon evaluation: Pt requires S with UB ADLs, S with LB ADLs, S with xfers and S with functional mobility 2* the following deficits impacting occupational performance: weakness, decreased balance, decreased tolerance, decreased mobilty and environmental deficits  Pt to benefit from continued skilled OT tx while in the hospital to address deficits as defined above and maximize level of functional independence w ADL's and functional mobility  Occupational Performance areas to address include: bathing/shower, medication management, health maintenance, functional mobility, community mobility, clothing management and household maintenance   From OT standpoint, recommendation at time of d/c would be home with out pt vs home with OT  Plan   Treatment Interventions ADL retraining;UE strengthening/ROM; Functional transfer training; Activityengagement; Energy conservation;Cardiac education;Continued evaluation;Equipment evaluation/education; Compensatory technique education;Patient/family training   Goal Expiration Date 02/18/19   OT Frequency 3-5x/wk   Recommendation   OT Discharge Recommendation Other (Comment)  (out pt OT vs home OT (pending comfort level driving))   OT - OK to Discharge Yes   Barthel Index   Feeding 10   Bathing 0   Grooming Score 5   Dressing Score 5   Bladder Score 10   Bowels Score 10   Toilet Use Score 5   Transfers (Bed/Chair) Score 10   Mobility (Level Surface) Score 0   Stairs Score 0   Barthel Index Score 55   Modified [place] Scale   Modified Wei Scale 3       Occupational therapy Goals: In 2-4 days    1 - Patient will completed passport to independence program in order to further determine DC needs as well as any additional recommendations/ education  2 - Patient will verbalize and demonstrate use of energy conservation/ deep breathing technique and work simplification skills during functional activity with no verbal cues  3 - Patient will verbalize and demonstrate good body mechanics and joint protection techniques during ADLs/ IADLs with no verbal cues     4 - Patient will increase OOB/ sitting tolerance to 4-6 hours per day for increased participation in self care and leisure tasks with no s/s of exertion  5 - Patient will identify s/s of exertion during ADL and functional mobility with no verbal cues  6 - Patient will verbalize/ demonstrate compensatory strategies to recover from exertion with no verbal cues  7 - Patient will increase standing tolerance time to 10 minutes with No UE support to complete sink level ADLs @ Mod I level  8 - Patient will increase sitting tolerance at edge of bed to 30 minutes to complete UB ADLs @ Indep  level       9 - Patient/ Family will demonstrate competency with UE Home Exercise Program    Juanito Bentley MS OTR/L

## 2019-02-05 NOTE — ASSESSMENT & PLAN NOTE
-presented to the emergency room with shortness of breath  -Creatinine at 5 50  Receiving dialysis awaiting outpatient HD arranged

## 2019-02-05 NOTE — PHYSICAL THERAPY NOTE
Physical Therapy Cancellation Note      Chart review performed  Patient unavailable for PT session secondary to patient currently at dialysis as per nursing  Will continue to follow and provide PT interventions as appropriate next session       Moiz Blanchard, PT

## 2019-02-05 NOTE — ASSESSMENT & PLAN NOTE
-Presented to the emergency room with shortness of breath , and chest pain Which he states is worse upon minimal exertion  -probably secondary to worsening CHF  -he has bilateral lower extremity edema  -chest x-ray completed official report pending  Requiring HD  Ef normal

## 2019-02-05 NOTE — PROGRESS NOTES
HEMODIALYSIS ROUNDING NOTE    Patient: Emely Bell               Sex: male          DOA: 1/30/2019 11:45 PM   YOB: 1950        Age:  70 y o         LOS:  LOS: 5 days             SUBJECTIVE     - Patient was seen during hemodialysis today     - Reviewed last 24 hrs events     No complaint wants to go home    CURRENT MEDICATIONS       Current Facility-Administered Medications:     acetaminophen (TYLENOL) tablet 650 mg, 650 mg, Oral, Q6H PRN, Tita Grissom MD    amLODIPine (NORVASC) tablet 10 mg, 10 mg, Oral, Daily, Greg Fuchs PA-C, 10 mg at 02/04/19 1248    b complex-vitamin C-folic acid (NEPHROCAPS) capsule 1 capsule, 1 capsule, Oral, Daily With Marli Adams MD, 1 capsule at 02/04/19 1851    calcium acetate (PHOSLO) capsule 667 mg, 667 mg, Oral, TID With Meals, Edward Vanegas MD, 667 mg at 02/05/19 0830    doxercalciferol (HECTOROL) injection 2 mcg, 2 mcg, Intravenous, Once per day on Tue Thu Sat, Edward Vanegas MD, 2 mcg at 02/05/19 0825    furosemide (LASIX) tablet 40 mg, 40 mg, Oral, BID (diuretic), Edward Vanegas MD, 40 mg at 02/05/19 0830    heparin (porcine) subcutaneous injection 5,000 Units, 5,000 Units, Subcutaneous, Q8H [facility], 5,000 Units at 02/05/19 0544 **AND** Platelet count, , , Once, Greg Fuchs PA-C    insulin glargine (LANTUS) subcutaneous injection 40 Units 0 4 mL, 40 Units, Subcutaneous, HS, Greg Fuchs PA-C, 40 Units at 02/04/19 2205    insulin lispro (HumaLOG) 100 units/mL subcutaneous injection 2-12 Units, 2-12 Units, Subcutaneous, TID AC, 6 Units at 02/04/19 1852 **AND** Fingerstick Glucose (POCT), , , TID AC, Greg Fuchs PA-C    insulin lispro (HumaLOG) 100 units/mL subcutaneous injection 2-12 Units, 2-12 Units, Subcutaneous, HS, Greg Fuchs PA-C, 2 Units at 02/04/19 2204    insulin lispro (HumaLOG) 100 units/mL subcutaneous injection 20 Units, 20 Units, Subcutaneous, TID With Meals, Greg Fuchs PA-C, 20 Units at 02/05/19 0830    ondansetron (ZOFRAN) injection 4 mg, 4 mg, Intravenous, Q8H PRN, Greg Fuchs PA-C    varenicline (CHANTIX) tablet 1 mg, 1 mg, Oral, BID, Greg Fuchs PA-C, 1 mg at 02/04/19 1851    OBJECTIVE     Current Weight: Weight - Scale: 113 kg (249 lb 1 9 oz)  Vitals:    02/05/19 0930   BP: 121/65   Pulse: 86   Resp:    Temp:    SpO2:        Intake/Output Summary (Last 24 hours) at 02/05/19 0947  Last data filed at 02/05/19 0737   Gross per 24 hour   Intake             1580 ml   Output             5775 ml   Net            -4195 ml       PHYSICAL EXAMINATION     Physical Exam   Constitutional: He is oriented to person, place, and time  He appears well-developed  No distress  HENT:   Head: Normocephalic  Mouth/Throat: Oropharynx is clear and moist    Eyes: Conjunctivae are normal  No scleral icterus  Neck: Neck supple  No JVD present  Cardiovascular: Normal rate and normal heart sounds  Pulmonary/Chest: Effort normal  He has no wheezes  Abdominal: Soft  There is no tenderness  Musculoskeletal: Normal range of motion  He exhibits no edema  Neurological: He is alert and oriented to person, place, and time  Skin: Skin is warm  No rash noted  Psychiatric: He has a normal mood and affect   His behavior is normal          LAB RESULTS       Results from last 7 days  Lab Units 02/05/19  0743 02/03/19  0616 02/02/19  0523 02/01/19  1402 01/31/19  1425 01/31/19  0557 01/31/19  0319 01/31/19  0010 01/31/19  0004   WBC Thousand/uL 12 37* 12 66* 12 01* 11 87*  --  12 26*  --   --  11 92*   HEMOGLOBIN g/dL 11 5* 11 8* 11 2* 10 9*  --  10 4*  --   --  11 4*   I STAT HEMOGLOBIN g/dl  --   --   --   --   --   --   --  11 9*  --    HEMATOCRIT % 37 2 37 1 35 4* 34 4*  --  33 5*  --   --  36 7   HEMATOCRIT, ISTAT %  --   --   --   --   --   --   --  35*  --    PLATELETS Thousands/uL 272 252 280 282  --  272 265  --  301   POTASSIUM mmol/L 4 0 3 6 3 8 4 2  --  4 7  --   --  4 5   CHLORIDE mmol/L 98* 99* 102 101  --  103  --   --  102   CO2 mmol/L 28 31 27 26 --  22  --   --  22   CO2, I-STAT mmol/L  --   --   --   --   --   --   --  23  --    BUN mg/dL 37* 34* 44* 66*  --  59*  --   --  58*   CREATININE mg/dL 4 66* 4 16* 4 40* 5 56*  --  5 50*  --   --  5 50*   EGFR ml/min/1 73sq m 12 14 13 10  --  10  --  10 10   CALCIUM mg/dL 8 6 8 5 8 7 8 7  --  8 3  --   --  8 6   MAGNESIUM mg/dL  --   --   --   --   --  1 7  --   --   --    PHOSPHORUS mg/dL  --   --   --   --  4 5* 4 8*  --   --   --    GLUCOSE, ISTAT mg/dl  --   --   --   --   --   --   --  368*  --        RADIOLOGY RESULTS     Results for orders placed during the hospital encounter of 01/30/19   XR chest 1 view portable    Narrative CHEST     INDICATION:   heart failure  COMPARISON:  None    EXAM PERFORMED/VIEWS:  XR CHEST PORTABLE      FINDINGS:    Cardiac silhouette is enlarged  Distended central pulmonary vasculature  No airspace consolidation, pneumothorax, or large pleural effusion  Osseous structures appear within normal limits for patient age  Impression Cardiomegaly and central pulmonary vascular congestion  Findings concur with the preliminary report by the referring clinician already in PACS and/or our electronic record EPIC  Workstation performed: [id number]       No results found for this or any previous visit  No results found for this or any previous visit  No results found for this or any previous visit  No results found for this or any previous visit  No results found for this or any previous visit  PLAN / RECOMMENDATIONS     1  End Stage Renal Disease:       I saw and examined patient during hemodialysis treatment  The patient was receiving hemodialysis for treatment of end stage renal disease  I have also reviewed vital signs, intake and output, lab results and recent events, and agreed with today's dialysis order  Access: No issue    2  Anemia:   on [place]    3   Disposition:  Waiting for outpatient dialysis placement      Aurea Yu MD  Nephrology  2/5/2019        Portions of the record may have been created with voice recognition software  Occasional wrong word or "sound a like" substitutions may have occurred due to the inherent limitations of voice recognition software  Read the chart carefully and recognize, using context, where substitutions have occurred

## 2019-02-05 NOTE — PROGRESS NOTES
Progress Note - Xochitl Terrazas 1950, 70 y o  male MRN: [id number]    Unit/Bed#: -01 Encounter: [id number]    Primary Care Provider: Jose Rafael eBaver MD   Date and time admitted to hospital: 1/30/2019 11:45 PM        Acute on chronic congestive heart failure ([address] )   Assessment & Plan    -Presented to the emergency room with shortness of breath , and chest pain Which he states is worse upon minimal exertion  -probably secondary to worsening CHF  -he has bilateral lower extremity edema  -chest x-ray completed official report pending  Requiring HD  Ef normal       Essential hypertension   Assessment & Plan    -Blood pressure is stable  -continue home medication     Type 2 diabetes mellitus with hyperglycemia (HCC)   Assessment & Plan    Glucose level at 370  -continue Lantus 40 units subcu HS  -continue Humalog 20 units subcu t i d  With meals  -sliding scale insulin coverage  -fingerstick glucose 4 times daily AC/HS     Other hyperlipidemia   Assessment & Plan    -Not currently on medication regimen  -encouraged PCP follow-up     * ESRD (end stage renal disease) on dialysis [facility])   Assessment & Plan    -presented to the emergency room with shortness of breath  -Creatinine at 5 50  Receiving dialysis awaiting outpatient HD arranged         VTE Pharmacologic Prophylaxis:   Pharmacologic: Heparin  Mechanical VTE Prophylaxis in Place: Yes    Patient Centered Rounds: I have performed bedside rounds with nursing staff today  Discussions with Specialists or Other Care Team Provider:     Education and Discussions with Family / Patient:     Time Spent for Care: 30 minutes  More than 50% of total time spent on counseling and coordination of care as described above      Current Length of Stay: 5 day(s)    Current Patient Status: Inpatient   Certification Statement: The patient will continue to require additional inpatient hospital stay due to awaiting outpatient HD     Discharge Plan: home    Code Status: Level 1 - Full Code      Subjective:   No c/o-breathing and leg swelling improved    Objective:     Vitals:   Temp (24hrs), Av 8 °F (36 6 °C), Min:97 4 °F (36 3 °C), Max:98 3 °F (36 8 °C)    Temp:  [97 4 °F (36 3 °C)-98 3 °F (36 8 °C)] 98 3 °F (36 8 °C)  HR:  [83-96] 92  Resp:  [18] 18  BP: (112-145)/(59-82) 145/61  SpO2:  [94 %-96 %] 95 %  Body mass index is 39 02 kg/m²  Input and Output Summary (last 24 hours): Intake/Output Summary (Last 24 hours) at 19 1129  Last data filed at 19 1113   Gross per 24 hour   Intake             1280 ml   Output             2875 ml   Net            -1595 ml       Physical Exam:     Physical Exam   Constitutional: He is oriented to person, place, and time  He appears well-developed and well-nourished  HENT:   Head: Normocephalic and atraumatic  Eyes: Pupils are equal, round, and reactive to light  Cardiovascular: Normal rate and regular rhythm  Pulmonary/Chest: Effort normal and breath sounds normal    Abdominal: Soft  Bowel sounds are normal    Musculoskeletal: He exhibits no edema  Neurological: He is alert and oriented to person, place, and time  Additional Data:     Labs:      Results from last 7 days  Lab Units 19  0743   WBC Thousand/uL 12 37*   HEMOGLOBIN g/dL 11 5*   HEMATOCRIT % 37 2   PLATELETS Thousands/uL 272   NEUTROS PCT % 68   LYMPHS PCT % 19   MONOS PCT % 7   EOS PCT % 5       Results from last 7 days  Lab Units 19  0743  19  0557   SODIUM mmol/L 137  < > 138   POTASSIUM mmol/L 4 0  < > 4 7   CHLORIDE mmol/L 98*  < > 103   CO2 mmol/L 28  < > 22   BUN mg/dL 37*  < > 59*   CREATININE mg/dL 4 66*  < > 5 50*   ANION GAP mmol/L 11  < > 13   CALCIUM mg/dL 8 6  < > 8 3   ALBUMIN g/dL  --   --  3 2*   TOTAL BILIRUBIN mg/dL  --   --  0 30   ALK PHOS U/L  --   --  91   ALT U/L  --   --  10*   AST U/L  --   --  9   GLUCOSE RANDOM mg/dL 123  < > 300*   < > = values in this interval not displayed      Results from last 7 days  Lab Units 01/31/19  0557   INR  1 12       Results from last 7 days  Lab Units 02/05/19  0622 02/04/19  2108 02/04/19  1552 02/04/19  1130 02/04/19  0556 02/03/19  2050 02/03/19  1556 02/03/19  1146 02/03/19  0641 02/02/19  2118 02/02/19  1551 02/02/19  1200   POC GLUCOSE mg/dl 102 160* 284* 220* 148* 106 221* 104 98 117 278* 112       Results from last 7 days  Lab Units 01/31/19  0557   HEMOGLOBIN A1C % 8 6*               * I Have Reviewed All Lab Data Listed Above  * Additional Pertinent Lab Tests Reviewed: All Labs Within Last 24 Hours Reviewed    Imaging:    Imaging Reports Reviewed Today Include:   Imaging Personally Reviewed by Myself Includes:      Recent Cultures (last 7 days):           Last 24 Hours Medication List:     Current Facility-Administered Medications:  acetaminophen 650 mg Oral Q6H PRN Davide Beatty MD   amLODIPine 10 mg Oral Daily Greg Fuchs PA-C   b complex-vitamin C-folic acid 1 capsule Oral Daily With Emelyn Tom MD   calcium acetate 667 mg Oral TID With Aretha Alvarez MD   doxercalciferol 2 mcg Intravenous Once per day on Tue Thu Sat Yusra Monroy MD   furosemide 40 mg Oral BID (diuretic) Yusra Monroy MD   heparin (porcine) 5,000 Units Subcutaneous Q8H [facility] Greg Fuchs PA-C   insulin glargine 40 Units Subcutaneous HS Greg Fuchs PA-C   insulin lispro 2-12 Units Subcutaneous TID AC Greg Fuchs PA-C   insulin lispro 2-12 Units Subcutaneous HS Greg Fuchs PA-C   insulin lispro 20 Units Subcutaneous TID With Meals Greg Fuchs PA-C   ondansetron 4 mg Intravenous Q8H PRN Greg Fuchs PA-C   varenicline 1 mg Oral BID Greg Fuchs PA-C        Today, Patient Was Seen By: Evon Rodriguez MD    ** Please Note: Dictation voice to text software may have been used in the creation of this document   **

## 2019-02-06 LAB
ANION GAP SERPL CALCULATED.3IONS-SCNC: 10 MMOL/L (ref 4–13)
BASOPHILS # BLD AUTO: 0.07 THOUSANDS/ΜL (ref 0–0.1)
BASOPHILS NFR BLD AUTO: 1 % (ref 0–1)
BUN SERPL-MCNC: 35 MG/DL (ref 5–25)
CALCIUM SERPL-MCNC: 8.6 MG/DL (ref 8.3–10.1)
CHLORIDE SERPL-SCNC: 97 MMOL/L (ref 100–108)
CO2 SERPL-SCNC: 30 MMOL/L (ref 21–32)
CREAT SERPL-MCNC: 4.46 MG/DL (ref 0.6–1.3)
EOSINOPHIL # BLD AUTO: 0.5 THOUSAND/ΜL (ref 0–0.61)
EOSINOPHIL NFR BLD AUTO: 4 % (ref 0–6)
ERYTHROCYTE [DISTWIDTH] IN BLOOD BY AUTOMATED COUNT: 13.9 % (ref 11.6–15.1)
GFR SERPL CREATININE-BSD FRML MDRD: 13 ML/MIN/1.73SQ M
GLUCOSE SERPL-MCNC: 124 MG/DL (ref 65–140)
GLUCOSE SERPL-MCNC: 124 MG/DL (ref 65–140)
GLUCOSE SERPL-MCNC: 138 MG/DL (ref 65–140)
GLUCOSE SERPL-MCNC: 238 MG/DL (ref 65–140)
GLUCOSE SERPL-MCNC: 251 MG/DL (ref 65–140)
HCT VFR BLD AUTO: 35.8 % (ref 36.5–49.3)
HGB BLD-MCNC: 11.2 G/DL (ref 12–17)
IMM GRANULOCYTES # BLD AUTO: 0.05 THOUSAND/UL (ref 0–0.2)
IMM GRANULOCYTES NFR BLD AUTO: 0 % (ref 0–2)
LYMPHOCYTES # BLD AUTO: 2.2 THOUSANDS/ΜL (ref 0.6–4.47)
LYMPHOCYTES NFR BLD AUTO: 19 % (ref 14–44)
MCH RBC QN AUTO: 28.6 PG (ref 26.8–34.3)
MCHC RBC AUTO-ENTMCNC: 31.3 G/DL (ref 31.4–37.4)
MCV RBC AUTO: 91 FL (ref 82–98)
MONOCYTES # BLD AUTO: 1.02 THOUSAND/ΜL (ref 0.17–1.22)
MONOCYTES NFR BLD AUTO: 9 % (ref 4–12)
NEUTROPHILS # BLD AUTO: 7.56 THOUSANDS/ΜL (ref 1.85–7.62)
NEUTS SEG NFR BLD AUTO: 67 % (ref 43–75)
NRBC BLD AUTO-RTO: 0 /100 WBCS
PLATELET # BLD AUTO: 200 THOUSANDS/UL (ref 149–390)
PMV BLD AUTO: 10.5 FL (ref 8.9–12.7)
POTASSIUM SERPL-SCNC: 3.8 MMOL/L (ref 3.5–5.3)
RBC # BLD AUTO: 3.92 MILLION/UL (ref 3.88–5.62)
SODIUM SERPL-SCNC: 137 MMOL/L (ref 136–145)
WBC # BLD AUTO: 11.4 THOUSAND/UL (ref 4.31–10.16)

## 2019-02-06 PROCEDURE — 80048 BASIC METABOLIC PNL TOTAL CA: CPT | Performed by: INTERNAL MEDICINE

## 2019-02-06 PROCEDURE — 99232 SBSQ HOSP IP/OBS MODERATE 35: CPT | Performed by: GENERAL PRACTICE

## 2019-02-06 PROCEDURE — 85025 COMPLETE CBC W/AUTO DIFF WBC: CPT | Performed by: INTERNAL MEDICINE

## 2019-02-06 PROCEDURE — 82948 REAGENT STRIP/BLOOD GLUCOSE: CPT

## 2019-02-06 RX ADMIN — INSULIN LISPRO 4 UNITS: 100 INJECTION, SOLUTION INTRAVENOUS; SUBCUTANEOUS at 12:01

## 2019-02-06 RX ADMIN — HEPARIN SODIUM 5000 UNITS: 5000 INJECTION, SOLUTION INTRAVENOUS; SUBCUTANEOUS at 06:17

## 2019-02-06 RX ADMIN — HEPARIN SODIUM 5000 UNITS: 5000 INJECTION, SOLUTION INTRAVENOUS; SUBCUTANEOUS at 22:25

## 2019-02-06 RX ADMIN — CALCIUM ACETATE 667 MG: 667 CAPSULE ORAL at 14:02

## 2019-02-06 RX ADMIN — INSULIN LISPRO 20 UNITS: 100 INJECTION, SOLUTION INTRAVENOUS; SUBCUTANEOUS at 09:26

## 2019-02-06 RX ADMIN — VARENICLINE TARTRATE 1 MG: 1 TABLET, FILM COATED ORAL at 11:19

## 2019-02-06 RX ADMIN — AMLODIPINE BESYLATE 10 MG: 10 TABLET ORAL at 09:25

## 2019-02-06 RX ADMIN — Medication 1 CAPSULE: at 16:42

## 2019-02-06 RX ADMIN — INSULIN LISPRO 20 UNITS: 100 INJECTION, SOLUTION INTRAVENOUS; SUBCUTANEOUS at 17:35

## 2019-02-06 RX ADMIN — INSULIN LISPRO 6 UNITS: 100 INJECTION, SOLUTION INTRAVENOUS; SUBCUTANEOUS at 16:43

## 2019-02-06 RX ADMIN — CALCIUM ACETATE 667 MG: 667 CAPSULE ORAL at 16:42

## 2019-02-06 RX ADMIN — FUROSEMIDE 40 MG: 40 TABLET ORAL at 16:42

## 2019-02-06 RX ADMIN — VARENICLINE TARTRATE 1 MG: 1 TABLET, FILM COATED ORAL at 16:47

## 2019-02-06 RX ADMIN — FUROSEMIDE 40 MG: 40 TABLET ORAL at 09:25

## 2019-02-06 RX ADMIN — INSULIN GLARGINE 40 UNITS: 100 INJECTION, SOLUTION SUBCUTANEOUS at 22:25

## 2019-02-06 RX ADMIN — HEPARIN SODIUM 5000 UNITS: 5000 INJECTION, SOLUTION INTRAVENOUS; SUBCUTANEOUS at 14:28

## 2019-02-06 RX ADMIN — INSULIN LISPRO 20 UNITS: 100 INJECTION, SOLUTION INTRAVENOUS; SUBCUTANEOUS at 12:01

## 2019-02-06 RX ADMIN — CALCIUM ACETATE 667 MG: 667 CAPSULE ORAL at 09:25

## 2019-02-06 NOTE — PROGRESS NOTES
Progress Note - Arias Ames 1950, 70 y o  male MRN: [id number]    Unit/Bed#: -[place] Encounter: [id number]    Primary Care Provider: Jewel Baron MD   Date and time admitted to hospital: 1/30/2019 11:45 PM        Acute on chronic congestive heart failure ([facility] [address] )   Assessment & Plan    -Presented to the emergency room with shortness of breath , and chest pain Which he states is worse upon minimal exertion  -probably secondary to worsening CHF  -he has bilateral lower extremity edema which has improved from admission  Requiring HD  Ef normal       Essential hypertension   Assessment & Plan    -Blood pressure is stable  -continue home medication     Type 2 diabetes mellitus with hyperglycemia (HCC)   Assessment & Plan    -continue Lantus 40 units subcu HS  -continue Humalog 20 units subcu t i d  With meals  -sliding scale insulin coverage  -fingerstick glucose 4 times daily AC/HS     Other hyperlipidemia   Assessment & Plan    -Not currently on medication regimen  -encouraged PCP follow-up     * ESRD (end stage renal disease) on dialysis [facility])   Assessment & Plan    -presented to the emergency room with shortness of breath  -Creatinine at 5 50  Receiving dialysis awaiting outpatient HD arranged         VTE Pharmacologic Prophylaxis:   Pharmacologic: Heparin  Mechanical VTE Prophylaxis in Place: Yes    Patient Centered Rounds: I have performed bedside rounds with nursing staff today  Discussions with Specialists or Other Care Team Provider:     Education and Discussions with Family / Patient:     Time Spent for Care: 30 minutes  More than 50% of total time spent on counseling and coordination of care as described above      Current Length of Stay: 6 day(s)    Current Patient Status: Inpatient   Certification Statement: The patient will continue to require additional inpatient hospital stay due to awaiting HD set up as outpatient    Discharge Plan: home    Code Status: Level 1 - Full Code      Subjective:   No c/o  Denies chest pain or shortness of breath  Objective:     Vitals:   Temp (24hrs), Av 3 °F (36 8 °C), Min:97 9 °F (36 6 °C), Max:98 6 °F (37 °C)    Temp:  [97 9 °F (36 6 °C)-98 6 °F (37 °C)] 98 6 °F (37 °C)  HR:  [86-94] 91  Resp:  [18] 18  BP: (121-145)/(58-74) 144/66  SpO2:  [91 %-94 %] 94 %  Body mass index is 39 09 kg/m²  Input and Output Summary (last 24 hours): Intake/Output Summary (Last 24 hours) at 19 0916  Last data filed at 19 0751   Gross per 24 hour   Intake              740 ml   Output             2400 ml   Net            -1660 ml       Physical Exam:     Physical Exam   Constitutional: He is oriented to person, place, and time  He appears well-developed and well-nourished  HENT:   Head: Normocephalic and atraumatic  Eyes: Pupils are equal, round, and reactive to light  Cardiovascular: Normal rate and regular rhythm  Pulmonary/Chest: Effort normal and breath sounds normal    Abdominal: Soft  Bowel sounds are normal    Musculoskeletal: He exhibits no edema  Neurological: He is alert and oriented to person, place, and time  Additional Data:     Labs:      Results from last 7 days  Lab Units 19  0616   WBC Thousand/uL 11 40*   HEMOGLOBIN g/dL 11 2*   HEMATOCRIT % 35 8*   PLATELETS Thousands/uL 200   NEUTROS PCT % 67   LYMPHS PCT % 19   MONOS PCT % 9   EOS PCT % 4       Results from last 7 days  Lab Units 19  0616  19  0557   SODIUM mmol/L 137  < > 138   POTASSIUM mmol/L 3 8  < > 4 7   CHLORIDE mmol/L 97*  < > 103   CO2 mmol/L 30  < > 22   BUN mg/dL 35*  < > 59*   CREATININE mg/dL 4 46*  < > 5 50*   ANION GAP mmol/L 10  < > 13   CALCIUM mg/dL 8 6  < > 8 3   ALBUMIN g/dL  --   --  3 2*   TOTAL BILIRUBIN mg/dL  --   --  0 30   ALK PHOS U/L  --   --  91   ALT U/L  --   --  10*   AST U/L  --   --  9   GLUCOSE RANDOM mg/dL 124  < > 300*   < > = values in this interval not displayed      Results from last 7 days  Lab Units 01/31/19  0557   INR  1 12       Results from last 7 days  Lab Units 02/06/19  0621 02/05/19  2049 02/05/19  1541 02/05/19  1154 02/05/19  0622 02/04/19  2108 02/04/19  1552 02/04/19  1130 02/04/19  0556 02/03/19  2050 02/03/19  1556 02/03/19  1146   POC GLUCOSE mg/dl 124 222* 162* 91 102 160* 284* 220* 148* 106 221* 104       Results from last 7 days  Lab Units 01/31/19  0557   HEMOGLOBIN A1C % 8 6*               * I Have Reviewed All Lab Data Listed Above  * Additional Pertinent Lab Tests Reviewed: All Labs Within Last 24 Hours Reviewed    Imaging:    Imaging Reports Reviewed Today Include:   Imaging Personally Reviewed by Myself Includes:      Recent Cultures (last 7 days):           Last 24 Hours Medication List:     Current Facility-Administered Medications:  acetaminophen 650 mg Oral Q6H PRN Olga Sauceda MD   amLODIPine 10 mg Oral Daily Greg Fuchs PA-C   b complex-vitamin C-folic acid 1 capsule Oral Daily With Missy Aly MD   calcium acetate 667 mg Oral TID With Karin Powers MD   doxercalciferol 2 mcg Intravenous Once per day on Tue Thu Sat Vanessa Stubbs MD   furosemide 40 mg Oral BID (diuretic) Vanessa Stubbs MD   heparin (porcine) 5,000 Units Subcutaneous Q8H [address] Greg Fuchs PA-C   insulin glargine 40 Units Subcutaneous HS KEM Brown-ALAN   insulin lispro 2-12 Units Subcutaneous TID AC Greg Fuchs PA-C   insulin lispro 2-12 Units Subcutaneous HS Greg Fuchs PA-C   insulin lispro 20 Units Subcutaneous TID With Meals Greg Fuchs PA-C   ondansetron 4 mg Intravenous Q8H PRN Greg Fuchs PA-C   varenicline 1 mg Oral BID Greg Fuchs PA-C        Today, Patient Was Seen By: Saarhy Benitez MD    ** Please Note: Dictation voice to text software may have been used in the creation of this document   **

## 2019-02-06 NOTE — PLAN OF CARE
CARDIOVASCULAR - ADULT     Maintains optimal cardiac output and hemodynamic stability Progressing        DISCHARGE PLANNING     Discharge to home or other facility with appropriate resources Progressing        DISCHARGE PLANNING - CARE MANAGEMENT     Discharge to post-acute care or home with appropriate resources Progressing        HEMATOLOGIC - ADULT     Maintains hematologic stability Progressing        INFECTION - ADULT     Absence or prevention of progression during hospitalization Progressing     Absence of fever/infection during neutropenic period Progressing        Knowledge Deficit     Patient/family/caregiver demonstrates understanding of disease process, treatment plan, medications, and discharge instructions Progressing        METABOLIC, FLUID AND ELECTROLYTES - ADULT     Glucose maintained within target range Progressing        PAIN - ADULT     Verbalizes/displays adequate comfort level or baseline comfort level Progressing        Potential for Falls     Patient will remain free of falls Progressing        SAFETY ADULT     Patient will remain free of falls Progressing     Maintain or return to baseline ADL function Progressing     Maintain or return mobility status to optimal level Progressing

## 2019-02-06 NOTE — PLAN OF CARE
Problem: DISCHARGE PLANNING - CARE MANAGEMENT  Goal: Discharge to post-acute care or home with appropriate resources  INTERVENTIONS:  - Conduct assessment to determine patient/family and health care team treatment goals, and need for post-acute services based on payer coverage, community resources, and patient preferences, and barriers to discharge  - Address psychosocial, clinical, and financial barriers to discharge as identified in assessment in conjunction with the patient/family and health care team  - Arrange appropriate level of post-acute services according to patients   needs and preference and payer coverage in collaboration with the physician and health care team  - Communicate with and update the patient/family, physician, and health care team regarding progress on the discharge plan  - Arrange appropriate transportation to post-acute venues  Outcome: Progressing  LOS 5  No 30 day readmit  CM met patient in the dialysis room while he is having dialysis  Patient informed he would like his HD chair to be MWF at 1 or 2PM   Patient states he is flexible with HD days and times  CM was unable to complete assessment  Patient is now finished with HD and will be transported home  CM will visit patient in his room at a different time to complete assessment  CM provided [facility] with information patient informed of choice HD days and times  CM awaits to get a response for [facility] for patient's chair time  Nurse and SLIM notified

## 2019-02-06 NOTE — SOCIAL WORK
LOS 5   No 30 day readmit  CM met patient in the dialysis room while he is having dialysis  Patient informed he would like his HD chair to be MWF at 1 or 2PM   Patient states he is flexible with HD days and times  CM was unable to complete assessment  Patient is now finished with HD and will be transported home  CM will visit patient in his room at a different time to complete assessment  CM provided [facility] with information patient informed of choice HD days and times  CM awaits to get a response for [facility] for patient's chair time  Nurse and SLIM notified

## 2019-02-06 NOTE — ASSESSMENT & PLAN NOTE
-Presented to the emergency room with shortness of breath , and chest pain Which he states is worse upon minimal exertion  -probably secondary to worsening CHF  -he has bilateral lower extremity edema which has improved from admission  Requiring HD  Ef normal

## 2019-02-06 NOTE — ASSESSMENT & PLAN NOTE
-continue Lantus 40 units subcu HS  -continue Humalog 20 units subcu t i d   With meals  -sliding scale insulin coverage  -fingerstick glucose 4 times daily AC/HS

## 2019-02-07 ENCOUNTER — APPOINTMENT (INPATIENT)
Dept: DIALYSIS | Facility: HOSPITAL | Age: 69
DRG: 673 | End: 2019-02-07
Payer: COMMERCIAL

## 2019-02-07 VITALS
HEART RATE: 85 BPM | TEMPERATURE: 98.5 F | OXYGEN SATURATION: 92 % | WEIGHT: 247.36 LBS | DIASTOLIC BLOOD PRESSURE: 78 MMHG | SYSTOLIC BLOOD PRESSURE: 147 MMHG | HEIGHT: 67 IN | RESPIRATION RATE: 18 BRPM | BODY MASS INDEX: 38.82 KG/M2

## 2019-02-07 PROBLEM — I50.9 ACUTE ON CHRONIC CONGESTIVE HEART FAILURE (HCC): Status: RESOLVED | Noted: 2019-01-31 | Resolved: 2019-02-07

## 2019-02-07 LAB
ANION GAP SERPL CALCULATED.3IONS-SCNC: 9 MMOL/L (ref 4–13)
BASOPHILS # BLD AUTO: 0.05 THOUSANDS/ΜL (ref 0–0.1)
BASOPHILS NFR BLD AUTO: 0 % (ref 0–1)
BUN SERPL-MCNC: 51 MG/DL (ref 5–25)
CALCIUM SERPL-MCNC: 8.5 MG/DL (ref 8.3–10.1)
CHLORIDE SERPL-SCNC: 97 MMOL/L (ref 100–108)
CO2 SERPL-SCNC: 29 MMOL/L (ref 21–32)
CREAT SERPL-MCNC: 5.37 MG/DL (ref 0.6–1.3)
EOSINOPHIL # BLD AUTO: 0.53 THOUSAND/ΜL (ref 0–0.61)
EOSINOPHIL NFR BLD AUTO: 5 % (ref 0–6)
ERYTHROCYTE [DISTWIDTH] IN BLOOD BY AUTOMATED COUNT: 13.9 % (ref 11.6–15.1)
GFR SERPL CREATININE-BSD FRML MDRD: 10 ML/MIN/1.73SQ M
GLUCOSE SERPL-MCNC: 133 MG/DL (ref 65–140)
GLUCOSE SERPL-MCNC: 278 MG/DL (ref 65–140)
HCT VFR BLD AUTO: 35.2 % (ref 36.5–49.3)
HGB BLD-MCNC: 11.1 G/DL (ref 12–17)
IMM GRANULOCYTES # BLD AUTO: 0.07 THOUSAND/UL (ref 0–0.2)
IMM GRANULOCYTES NFR BLD AUTO: 1 % (ref 0–2)
LYMPHOCYTES # BLD AUTO: 2 THOUSANDS/ΜL (ref 0.6–4.47)
LYMPHOCYTES NFR BLD AUTO: 17 % (ref 14–44)
MCH RBC QN AUTO: 28.8 PG (ref 26.8–34.3)
MCHC RBC AUTO-ENTMCNC: 31.5 G/DL (ref 31.4–37.4)
MCV RBC AUTO: 91 FL (ref 82–98)
MONOCYTES # BLD AUTO: 0.85 THOUSAND/ΜL (ref 0.17–1.22)
MONOCYTES NFR BLD AUTO: 7 % (ref 4–12)
NEUTROPHILS # BLD AUTO: 7.99 THOUSANDS/ΜL (ref 1.85–7.62)
NEUTS SEG NFR BLD AUTO: 70 % (ref 43–75)
NRBC BLD AUTO-RTO: 0 /100 WBCS
PLATELET # BLD AUTO: 230 THOUSANDS/UL (ref 149–390)
PMV BLD AUTO: 10.8 FL (ref 8.9–12.7)
POTASSIUM SERPL-SCNC: 3.8 MMOL/L (ref 3.5–5.3)
RBC # BLD AUTO: 3.86 MILLION/UL (ref 3.88–5.62)
SODIUM SERPL-SCNC: 135 MMOL/L (ref 136–145)
WBC # BLD AUTO: 11.49 THOUSAND/UL (ref 4.31–10.16)

## 2019-02-07 PROCEDURE — 90935 HEMODIALYSIS ONE EVALUATION: CPT | Performed by: INTERNAL MEDICINE

## 2019-02-07 PROCEDURE — 99239 HOSP IP/OBS DSCHRG MGMT >30: CPT | Performed by: GENERAL PRACTICE

## 2019-02-07 PROCEDURE — 85025 COMPLETE CBC W/AUTO DIFF WBC: CPT | Performed by: INTERNAL MEDICINE

## 2019-02-07 PROCEDURE — 80048 BASIC METABOLIC PNL TOTAL CA: CPT | Performed by: INTERNAL MEDICINE

## 2019-02-07 PROCEDURE — 82948 REAGENT STRIP/BLOOD GLUCOSE: CPT

## 2019-02-07 RX ORDER — CHOLECALCIFEROL (VITAMIN D3) 10 MCG
1 TABLET ORAL
Qty: 30 CAPSULE | Refills: 0 | Status: SHIPPED | OUTPATIENT
Start: 2019-02-07 | End: 2020-09-25

## 2019-02-07 RX ADMIN — CALCIUM ACETATE 667 MG: 667 CAPSULE ORAL at 14:22

## 2019-02-07 RX ADMIN — HEPARIN SODIUM 5000 UNITS: 5000 INJECTION, SOLUTION INTRAVENOUS; SUBCUTANEOUS at 06:05

## 2019-02-07 RX ADMIN — INSULIN LISPRO 20 UNITS: 100 INJECTION, SOLUTION INTRAVENOUS; SUBCUTANEOUS at 08:55

## 2019-02-07 RX ADMIN — INSULIN LISPRO 20 UNITS: 100 INJECTION, SOLUTION INTRAVENOUS; SUBCUTANEOUS at 14:24

## 2019-02-07 RX ADMIN — DOXERCALCIFEROL 2 MCG: 4 INJECTION, SOLUTION INTRAVENOUS at 12:03

## 2019-02-07 RX ADMIN — AMLODIPINE BESYLATE 10 MG: 10 TABLET ORAL at 14:22

## 2019-02-07 NOTE — PLAN OF CARE
Problem: DISCHARGE PLANNING - CARE MANAGEMENT  Goal: Discharge to post-acute care or home with appropriate resources  INTERVENTIONS:  - Conduct assessment to determine patient/family and health care team treatment goals, and need for post-acute services based on payer coverage, community resources, and patient preferences, and barriers to discharge  - Address psychosocial, clinical, and financial barriers to discharge as identified in assessment in conjunction with the patient/family and health care team  - Arrange appropriate level of post-acute services according to patients   needs and preference and payer coverage in collaboration with the physician and health care team  - Communicate with and update the patient/family, physician, and health care team regarding progress on the discharge plan  - Arrange appropriate transportation to post-acute venues   Outcome: Progressing  LOS 6  CM met with patient at bedside  Patient states he lives with his niece Lyly Calderon in a two story house  There are two stairs to enter the house through the front door  Patient states he remains on the first floor  Patient is independent with ADLs and does not use DME  Patient does not have rehab hx and does not have hhc hx  Patient fills his prescriptions with [facility], [address]  [place]  Patient denies mental health dx hx and substance abuse hx  Patient states his niece Clarissa Guevara is his health representative  Patient is retired  Patient drives to his doctor's appointments  Upon clearance for discharge [facility] will transport patient home  Patient is agreeable for HD at [facility] chair time TTS 11:15am first day 2/12  Nurse and SLIM and nephrologist notified

## 2019-02-07 NOTE — SOCIAL WORK
LOS 6   CM met with patient at bedside  Patient states he lives with his niece - Anusha Sevilla in a two story house  There are two stairs to enter the house through the front door  Patient states he remains on the first floor  Patient is independent with ADLs and does not use DME  Patient does not have rehab hx and does not have hhc hx  Patient fills his prescriptions [facility]  [place]  Patient denies mental health dx hx and substance abuse hx  Patient states his niece Kelli Miner is his health representative  Patient is retired  Patient drives to his doctor's appointments  Upon clearance for discharge [facility] will transport patient home  Patient is agreeable for HD at [facility] chair time TTS 11:15am first day 2/12  Nurse and SLIM and nephrologist notified  CM reviewed discharge planning process including the following: identifying help at home, patient preference for discharge planning needs, pharmacy preference, and availability of treatment team to discuss questions or concerns patient and/or family may have regarding understanding medications and recognizing signs and symptoms once discharged  CM also encouraged patient to follow up with all recommended appointments after discharge  Patient advised of importance for patient and family to participate in managing patients medical well being  CM name and role reviewed  Discharge Checklist reviewed and CM will continue to monitor for progress toward discharge goals in nursing and provider rounds

## 2019-02-07 NOTE — DISCHARGE SUMMARY
Discharge- Chris Bentley 1950, 70 y o  male MRN: [id number]    Unit/Bed#: -01 Encounter: [id number]    Primary Care Provider: Paul Lopez MD   Date and time admitted to hospital: 1/30/2019 11:45 PM        Acute on chronic congestive heart failure ([address] )   Assessment & Plan    -Presented to the emergency room with shortness of breath , and chest pain Which he states is worse upon minimal exertion  -probably secondary to worsening CHF  -he has bilateral lower extremity edema which has improved from admission  Requiring HD  Ef normal       Essential hypertension   Assessment & Plan    -Blood pressure is stable  -continue home medication     Type 2 diabetes mellitus with hyperglycemia (HCC)   Assessment & Plan    -continue Lantus 40 units subcu HS  -continue Humalog 20 units subcu t i d   With meals  -sliding scale insulin coverage  -fingerstick glucose 4 times daily AC/HS     Other hyperlipidemia   Assessment & Plan    -Not currently on medication regimen  -encouraged PCP follow-up     * ESRD (end stage renal disease) on dialysis [facility])   Assessment & Plan    -presented to the emergency room with shortness of breath  -Creatinine at 5 50  Receiving dialysis awaiting outpatient HD arranged  Chair available for Saturday 2/9/19-stable for discharge           Discharging Physician / Practitioner: Angelic Daily MD  PCP: Paul Lopez MD  Admission Date:   Admission Orders     Ordered        01/31/19 0104  Inpatient Admission (expected length of stay for this patient Order details is greater than two midnights)  Once             Discharge Date: 02/07/19    Resolved Problems  Date Reviewed: 1/31/2019    None          Consultations During Hospital Stay:  · Nephrology, Interventional Radiology and Cardiology    Procedures Performed:   · Tunneled hemodialysis catheter placement    Significant Findings / Test Results:   · Chronic kidney disease requiring hemodialysis and congestive heart failure    Incidental Findings:   ·      Test Results Pending at Discharge (will require follow up):   ·      Outpatient Tests Requested:  ·     Complications:      Reason for Admission:  Shortness of breath    Hospital Course: Dotty Barnard is a 70 y o  male patient who originally presented to the hospital on 1/30/2019 due to shortness of breath        Please see above list of diagnoses and related plan for additional information  Condition at Discharge: stable     Discharge Day Visit / Exam:     Subjective:  No complaints  Vitals: Blood Pressure: 145/72 (02/07/19 1130)  Pulse: 92 (02/07/19 1130)  Temperature: 98 5 °F (36 9 °C) (02/07/19 0700)  Temp Source: Oral (02/07/19 0700)  Respirations: 18 (02/07/19 0700)  Height: 5' 7" (170 2 cm) (02/01/19 1345)  Weight - Scale: 112 kg (247 lb 5 7 oz) (02/07/19 0536)  SpO2: 92 % (02/07/19 0700)  Exam:   Physical Exam   Constitutional: He is oriented to person, place, and time  He appears well-developed and well-nourished  HENT:   Head: Normocephalic and atraumatic  Eyes: Pupils are equal, round, and reactive to light  Cardiovascular: Normal rate and regular rhythm  Pulmonary/Chest: Effort normal and breath sounds normal    Abdominal: Soft  Bowel sounds are normal    Musculoskeletal: He exhibits no edema  Neurological: He is alert and oriented to person, place, and time  Discussion with Family:       Discharge instructions/Information to patient and family:   See after visit summary for information provided to patient and family  Provisions for Follow-Up Care:  See after visit summary for information related to follow-up care and any pertinent home health orders  Disposition:     Home    For Discharges to [facility] SNF:   · Not Applicable to this Patient - Not Applicable to this Patient    Planned Readmission:      Discharge Statement:  I spent 40 minutes discharging the patient  This time was spent on the day of discharge   I had direct contact with the patient on the day of discharge  Greater than 50% of the total time was spent examining patient, answering all patient questions, arranging and discussing plan of care with patient as well as directly providing post-discharge instructions  Additional time then spent on discharge activities  Discharge Medications:  See after visit summary for reconciled discharge medications provided to patient and family        ** Please Note: This note has been constructed using a voice recognition system **

## 2019-02-07 NOTE — OCCUPATIONAL THERAPY NOTE
OCCUPATIONAL THERAPY CANCELLATION  NOTE    OT made several attempts to see pt  Pt at dialysis                                            CRISTIAN Parada/JASMYN

## 2019-02-07 NOTE — ASSESSMENT & PLAN NOTE
-presented to the emergency room with shortness of breath  -Creatinine at 5 50  Receiving dialysis awaiting outpatient HD arranged  Chair available for Saturday 2/9/19-stable for discharge

## 2019-02-07 NOTE — PROGRESS NOTES
HEMODIALYSIS ROUNDING NOTE    Patient: Manoj Portillo               Sex: male          DOA: 1/30/2019 11:45 PM   YOB: 1950        Age:  70 y o         LOS:  LOS: 7 days             SUBJECTIVE     - Patient was seen during hemodialysis today  - Reviewed last 24 hrs events      No new development    No chest pain palpitation or shortness of Breath    CURRENT MEDICATIONS       Current Facility-Administered Medications:     acetaminophen (TYLENOL) tablet 650 mg, 650 mg, Oral, Q6H PRN, Ren Dominguez MD    amLODIPine (NORVASC) tablet 10 mg, 10 mg, Oral, Daily, Greg Fuchs PA-C, 10 mg at 02/06/19 4837    b complex-vitamin C-folic acid (NEPHROCAPS) capsule 1 capsule, 1 capsule, Oral, Daily With Sanjay Fernandez MD, 1 capsule at 02/06/19 1642    calcium acetate (PHOSLO) capsule 667 mg, 667 mg, Oral, TID With Meals, Josefina Hudson MD, 667 mg at 02/06/19 1642    doxercalciferol (HECTOROL) injection 2 mcg, 2 mcg, Intravenous, Once per day on Tue Thu Sat, Josefina Hudson MD, 2 mcg at 02/05/19 0825    furosemide (LASIX) tablet 40 mg, 40 mg, Oral, BID (diuretic), Josefina Hudson MD, 40 mg at 02/06/19 1642    heparin (porcine) subcutaneous injection 5,000 Units, 5,000 Units, Subcutaneous, Q8H [address], 5,000 Units at 02/07/19 0605 **AND** Platelet count, , , Once, Greg Fuchs PA-C    insulin glargine (LANTUS) subcutaneous injection 40 Units 0 4 mL, 40 Units, Subcutaneous, HS, Greg Fuchs PA-C, 40 Units at 02/06/19 2225    insulin lispro (HumaLOG) 100 units/mL subcutaneous injection 2-12 Units, 2-12 Units, Subcutaneous, TID AC, 6 Units at 02/06/19 1643 **AND** Fingerstick Glucose (POCT), , , TID AC, Greg Fuchs PA-C    insulin lispro (HumaLOG) 100 units/mL subcutaneous injection 2-12 Units, 2-12 Units, Subcutaneous, HS, Greg Fuchs PA-C, 2 Units at 02/05/19 2127    insulin lispro (HumaLOG) 100 units/mL subcutaneous injection 20 Units, 20 Units, Subcutaneous, TID With Meals, Greg Fuchs PA-C, 20 Units at 02/07/19 0855    ondansetron (ZOFRAN) injection 4 mg, 4 mg, Intravenous, Q8H PRN, Greg Fuchs PA-C    varenicline (CHANTIX) tablet 1 mg, 1 mg, Oral, BID, Greg Fuchs PA-C, 1 mg at 02/06/19 1647    OBJECTIVE     Current Weight: Weight - Scale: 112 kg (247 lb 5 7 oz)  Vitals:    02/07/19 0700   BP: 122/59   Pulse: 88   Resp: 18   Temp: 98 5 °F (36 9 °C)   SpO2: 92%       Intake/Output Summary (Last 24 hours) at 02/07/19 0934  Last data filed at 02/07/19 0900   Gross per 24 hour   Intake              200 ml   Output              450 ml   Net             -250 ml       PHYSICAL EXAMINATION     Physical Exam   Constitutional: He is oriented to person, place, and time  He appears well-developed  No distress  HENT:   Head: Normocephalic  Mouth/Throat: Oropharynx is clear and moist    Eyes: Conjunctivae are normal  No scleral icterus  Neck: Neck supple  No JVD present  Cardiovascular: Normal rate and normal heart sounds  Pulmonary/Chest: Effort normal  He has no wheezes  Abdominal: Soft  There is no tenderness  Musculoskeletal: Normal range of motion  He exhibits no edema  Neurological: He is alert and oriented to person, place, and time  Skin: Skin is warm  No rash noted  Psychiatric: He has a normal mood and affect   His behavior is normal          LAB RESULTS       Results from last 7 days  Lab Units 02/07/19  0913 02/06/19  0616 02/05/19  0743 02/03/19  0616 02/02/19  0523 02/01/19  1402 01/31/19  1425   WBC Thousand/uL 11 49* 11 40* 12 37* 12 66* 12 01* 11 87*  --    HEMOGLOBIN g/dL 11 1* 11 2* 11 5* 11 8* 11 2* 10 9*  --    HEMATOCRIT % 35 2* 35 8* 37 2 37 1 35 4* 34 4*  --    PLATELETS Thousands/uL 230 200 272 252 280 282  --    POTASSIUM mmol/L 3 8 3 8 4 0 3 6 3 8 4 2  --    CHLORIDE mmol/L 97* 97* 98* 99* 102 101  --    CO2 mmol/L 29 30 28 31 27 26  --    BUN mg/dL 51* 35* 37* 34* 44* 66*  --    CREATININE mg/dL 5 37* 4 46* 4 66* 4 16* 4 40* 5 56*  --    EGFR ml/min/1 73sq m 10 13 12 14 13 10  -- CALCIUM mg/dL 8 5 8 6 8 6 8 5 8 7 8 7  --    PHOSPHORUS mg/dL  --   --   --   --   --   --  4 5*       RADIOLOGY RESULTS     Results for orders placed during the hospital encounter of 01/30/19   XR chest 1 view portable    Narrative CHEST     INDICATION:   heart failure  COMPARISON:  None    EXAM PERFORMED/VIEWS:  XR CHEST PORTABLE      FINDINGS:    Cardiac silhouette is enlarged  Distended central pulmonary vasculature  No airspace consolidation, pneumothorax, or large pleural effusion  Osseous structures appear within normal limits for patient age  Impression Cardiomegaly and central pulmonary vascular congestion  Findings concur with the preliminary report by the referring clinician already in PACS and/or our electronic record EPIC  Workstation performed: [id number]       No results found for this or any previous visit  No results found for this or any previous visit  No results found for this or any previous visit  No results found for this or any previous visit  No results found for this or any previous visit  PLAN / RECOMMENDATIONS     1  End Stage Renal Disease:       I saw and examined patient during hemodialysis treatment  The patient was receiving hemodialysis for treatment of end stage renal disease  I have also reviewed vital signs, intake and output, lab results and recent events, and agreed with today's dialysis order  Access: No issue    2  Anemia:   stable    3  Disposition:  I spoke with [facility] dialysis unit they will take him this Saturday at 6:15 a m  I conveyed to the case management so he can be discharged renal point of view      Humera Osborne MD  Nephrology  2/7/2019        Portions of the record may have been created with voice recognition software  Occasional wrong word or "sound a like" substitutions may have occurred due to the inherent limitations of voice recognition software   Read the chart carefully and recognize, using context, where substitutions have occurred

## 2019-07-17 DIAGNOSIS — I10 ESSENTIAL HYPERTENSION: Primary | ICD-10-CM

## 2019-07-17 DIAGNOSIS — E83.39 HYPERPHOSPHATEMIA: Primary | ICD-10-CM

## 2019-07-17 RX ORDER — AMLODIPINE BESYLATE 10 MG/1
10 TABLET ORAL DAILY
Qty: 90 TABLET | Refills: 1 | Status: SHIPPED | OUTPATIENT
Start: 2019-07-17 | End: 2019-07-23 | Stop reason: SDUPTHER

## 2019-07-17 RX ORDER — FUROSEMIDE 20 MG/1
20 TABLET ORAL DAILY
Qty: 90 TABLET | Refills: 1 | Status: SHIPPED | OUTPATIENT
Start: 2019-07-17 | End: 2019-07-23 | Stop reason: SDUPTHER

## 2019-07-23 DIAGNOSIS — I10 ESSENTIAL HYPERTENSION: ICD-10-CM

## 2019-07-24 RX ORDER — FUROSEMIDE 20 MG/1
20 TABLET ORAL DAILY
Qty: 90 TABLET | Refills: 1 | Status: SHIPPED | OUTPATIENT
Start: 2019-07-24 | End: 2020-03-16

## 2019-07-24 RX ORDER — AMLODIPINE BESYLATE 10 MG/1
10 TABLET ORAL DAILY
Qty: 90 TABLET | Refills: 1 | Status: SHIPPED | OUTPATIENT
Start: 2019-07-24

## 2019-10-07 DIAGNOSIS — E83.39 HYPERPHOSPHATEMIA: ICD-10-CM

## 2019-10-07 RX ORDER — CALCIUM ACETATE 667 MG/1
CAPSULE ORAL
Qty: 180 CAPSULE | Refills: 1 | Status: SHIPPED | OUTPATIENT
Start: 2019-10-07 | End: 2020-01-24

## 2020-01-23 DIAGNOSIS — E83.39 HYPERPHOSPHATEMIA: ICD-10-CM

## 2020-01-24 RX ORDER — CALCIUM ACETATE 667 MG/1
CAPSULE ORAL
Qty: 270 CAPSULE | Refills: 0 | Status: SHIPPED | OUTPATIENT
Start: 2020-01-24

## 2020-03-13 DIAGNOSIS — I10 ESSENTIAL HYPERTENSION: ICD-10-CM

## 2020-03-16 RX ORDER — FUROSEMIDE 20 MG/1
TABLET ORAL
Qty: 90 TABLET | Refills: 1 | Status: SHIPPED | OUTPATIENT
Start: 2020-03-16 | End: 2020-09-10

## 2020-06-18 ENCOUNTER — TELEPHONE (OUTPATIENT)
Dept: UROLOGY | Facility: AMBULATORY SURGERY CENTER | Age: 70
End: 2020-06-18

## 2020-07-10 ENCOUNTER — TELEPHONE (OUTPATIENT)
Dept: NEPHROLOGY | Facility: CLINIC | Age: 70
End: 2020-07-10
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
## 2020-07-21 ENCOUNTER — TELEPHONE (OUTPATIENT)
Dept: UROLOGY | Facility: AMBULATORY SURGERY CENTER | Age: 70
End: 2020-07-21

## 2020-07-21 ENCOUNTER — OFFICE VISIT (OUTPATIENT)
Dept: UROLOGY | Facility: AMBULATORY SURGERY CENTER | Age: 70
End: 2020-07-21
Payer: COMMERCIAL

## 2020-07-21 VITALS
DIASTOLIC BLOOD PRESSURE: 70 MMHG | HEART RATE: 102 BPM | HEIGHT: 67 IN | TEMPERATURE: 98 F | WEIGHT: 228 LBS | BODY MASS INDEX: 35.79 KG/M2 | SYSTOLIC BLOOD PRESSURE: 132 MMHG

## 2020-07-21 DIAGNOSIS — N48.89 PENILE PAIN: ICD-10-CM

## 2020-07-21 DIAGNOSIS — R33.9 URINARY RETENTION: Primary | ICD-10-CM

## 2020-07-21 LAB
POST-VOID RESIDUAL VOLUME, ML POC: 598 ML
SL AMB  POCT GLUCOSE, UA: 500
SL AMB LEUKOCYTE ESTERASE,UA: NORMAL
SL AMB POCT BILIRUBIN,UA: 0.2
SL AMB POCT BLOOD,UA: NORMAL
SL AMB POCT CLARITY,UA: CLEAR
SL AMB POCT COLOR,UA: YELLOW
SL AMB POCT KETONES,UA: NORMAL
SL AMB POCT NITRITE,UA: NORMAL
SL AMB POCT PH,UA: 6.5
SL AMB POCT SPECIFIC GRAVITY,UA: 1.01
SL AMB POCT URINE PROTEIN: NORMAL
SL AMB POCT UROBILINOGEN: 0.2

## 2020-07-21 PROCEDURE — 51798 US URINE CAPACITY MEASURE: CPT | Performed by: UROLOGY

## 2020-07-21 PROCEDURE — 51702 INSERT TEMP BLADDER CATH: CPT

## 2020-07-21 PROCEDURE — 81002 URINALYSIS NONAUTO W/O SCOPE: CPT | Performed by: UROLOGY

## 2020-07-21 PROCEDURE — 99204 OFFICE O/P NEW MOD 45 MIN: CPT | Performed by: UROLOGY

## 2020-07-21 RX ORDER — TAMSULOSIN HYDROCHLORIDE 0.4 MG/1
0.4 CAPSULE ORAL
Qty: 30 CAPSULE | Refills: 1 | Status: SHIPPED | OUTPATIENT
Start: 2020-07-21 | End: 2020-08-24

## 2020-07-21 NOTE — PROGRESS NOTES
7/21/2020  Shanelle Abebe is a 78 y o  male  [id number]    Diagnosis:  Chief Complaint     Advice Only          Patient presents for lawrence placement managed by Dr Juanita Cruz:  · Patient to follow up as scheduled for nurse visit in one week for lawrence removal/void trial in [facility]  · To follow up as scheduled for cysto in [facility]  · Patient instructed to call with any questions or concerns in the meantime  Orders Placed This Encounter   Procedures    POCT urine dip    POCT Measure PVR        Vitals:    07/21/20 1459   BP: 132/70   Pulse: 102   Temp: 98 °F (36 7 °C)   Weight: 103 kg (228 lb)   Height: 5' 7" (1 702 m)           Procedure:    Bladder catheterization  Date/Time: 7/21/2020 4:25 PM  Performed by: César Barbour RN  Authorized by: Kemi Hilton MD     Consent:     Consent obtained:  Verbal    Consent given by:  Patient  Universal protocol:     Patient identity confirmed:  Verbally with patient  Pre-procedure details:     Procedure purpose:  Therapeutic    Preparation: Patient was prepped and draped in usual sterile fashion    Anesthesia (see MAR for exact dosages): Anesthesia method:  None  Procedure details:     Bladder irrigation: no      Catheter insertion:  Indwelling    Approach: natural orifice      Catheter type:  Coude, latex and Lawrence    Catheter size:  16 Fr    Number of attempts:  1    Successful placement: yes      Urine characteristics:  Clear and yellow  Post-procedure details:     Patient tolerance of procedure: Tolerated well, no immediate complications  Comments:      10 ml balloon inflated with sterile saline  300 ml clear yellow urine drained  Attached to leg bag  Extra leg bag, overnight bag, and stat lock given  Due to shortness of catheter, stat lock not placed  Discussed lawrence care and comfort measures at length  Questions answered to Patient's satisfaction        As per Dr Brooklynn Kaplan, Patient to follow up in one week for nurse visit void trial and next available cysto  Patient requesting [place] office due to travel          Gab Dumont RN

## 2020-07-21 NOTE — ASSESSMENT & PLAN NOTE
I discussed with the patient that he is an overflow incontinence and is in retention  We will place a Fisher catheter today and start him on tamsulosin  Side effects were discussed  He will follow up in a week or 2 for a void trial with our nurse and have an office cystoscopy set up in our [address] [place] [address] office

## 2020-07-21 NOTE — TELEPHONE ENCOUNTER
Patient seen today by Dr Warner Deutsch for penile pain and urinary retention  Fisher placed and Patient scheduled for nurse visit in one week for void trial  Requesting [facility] due to travel  Patient also has dialysis on Tue/Thurs/Sat  Scheduled on 7/29/2020 as per Patient's preference
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
## 2020-07-27 ENCOUNTER — TELEPHONE (OUTPATIENT)
Dept: UROLOGY | Facility: MEDICAL CENTER | Age: 70
End: 2020-07-27

## 2020-07-27 NOTE — TELEPHONE ENCOUNTER
Call placed to patient, rescheduled to 7/28 per his request  Supportive measures reviewed for meanwhile

## 2020-07-27 NOTE — TELEPHONE ENCOUNTER
Patient managed by Dr Jhonny Nieves  Patient niece called in stating patient is in a lot of pain and very comfortable with the catheter  Patient would like to know if he can be seen today or tomorrow     Patient can be reached at [phone]

## 2020-07-27 NOTE — TELEPHONE ENCOUNTER
Ollie Yaenz           7/27/20 8:53 AM   Note      Patient managed by Dr Valencia Roles  Patient niece called in stating patient is in a lot of pain and very comfortable with the catheter  Patient would like to know if he can be seen today or tomorrow     Patient can be reached at [phone]

## 2020-07-28 ENCOUNTER — PROCEDURE VISIT (OUTPATIENT)
Dept: UROLOGY | Facility: CLINIC | Age: 70
End: 2020-07-28
Payer: COMMERCIAL

## 2020-07-28 VITALS
BODY MASS INDEX: 35.71 KG/M2 | SYSTOLIC BLOOD PRESSURE: 136 MMHG | DIASTOLIC BLOOD PRESSURE: 78 MMHG | HEIGHT: 67 IN | HEART RATE: 82 BPM | TEMPERATURE: 98.4 F

## 2020-07-28 DIAGNOSIS — R33.9 URINARY RETENTION: Primary | ICD-10-CM

## 2020-07-28 LAB — POST-VOID RESIDUAL VOLUME, ML POC: 91 ML

## 2020-07-28 PROCEDURE — 51798 US URINE CAPACITY MEASURE: CPT

## 2020-08-12 DIAGNOSIS — R33.9 URINARY RETENTION: ICD-10-CM

## 2020-08-21 ENCOUNTER — TELEPHONE (OUTPATIENT)
Dept: UROLOGY | Facility: MEDICAL CENTER | Age: 70
End: 2020-08-21

## 2020-08-21 NOTE — TELEPHONE ENCOUNTER
Call received from Niece, Bubba Waite,  in regard to upcoming cysto  They would like a form describing and with instruction on the cysto    Niece reported they can pick it up when called   Cheryl Kate can be reached at [phone]  Thank you

## 2020-08-21 NOTE — TELEPHONE ENCOUNTER
Attempted to call patient to answer an questions regarding upcoming Cysto and to let them know we have some information in paper for them, however mailbox full and unable to leave message

## 2020-08-24 RX ORDER — TAMSULOSIN HYDROCHLORIDE 0.4 MG/1
CAPSULE ORAL
Qty: 30 CAPSULE | Refills: 1 | Status: SHIPPED | OUTPATIENT
Start: 2020-08-24 | End: 2020-09-25

## 2020-09-07 DIAGNOSIS — R33.9 URINARY RETENTION: ICD-10-CM

## 2020-09-09 DIAGNOSIS — I10 ESSENTIAL HYPERTENSION: ICD-10-CM

## 2020-09-10 RX ORDER — FUROSEMIDE 20 MG/1
TABLET ORAL
Qty: 90 TABLET | Refills: 1 | Status: SHIPPED | OUTPATIENT
Start: 2020-09-10 | End: 2021-01-30

## 2020-09-22 NOTE — PROGRESS NOTES
Problem List Items Addressed This Visit        Genitourinary    ESRD (end stage renal disease) on dialysis ([facility] [address] )    Relevant Orders    POCT Measure PVR (Completed)    Urinary retention - Primary    Relevant Medications    tamsulosin (FLOMAX) 0 4 mg    finasteride (PROSCAR) 5 mg tablet    Other Relevant Orders    POCT Measure PVR (Completed)      Other Visit Diagnoses     BPH with obstruction/lower urinary tract symptoms        Relevant Medications    tamsulosin (FLOMAX) 0 4 mg    finasteride (PROSCAR) 5 mg tablet            Discussion:  I had a very nice visit with Anselmo today  He is emptying appropriately with a postvoid residual urine volume of 100 milliliters  He only voided 43 milliliters with a peak flow of 6 milliliters/second, likely due to a low voided volume  Importantly, he was originally scheduled for cystoscopy and transrectal ultrasound, however he does not want this done, and given the fact that he is urinating I think that the best treatment for him is to have him on dual medical therapy at this time  He will see us back in 6 months, for a PVR determination, if doing well at that time he can stay on dual medical therapy and follow on a yearly basis  He did inquire as to removing his penile prosthesis, as he is concerned that as he gets older he will not be able to have this removed as he gets older and sicker  As the implant is still working, and it is not infected, I would not recommend any surgical intervention at this time      All questions and concerns answered and addressed      Assessment and plan:     Please see problem oriented charting for the assessment plan of today's urological complaints      Doe Carrasco MD      Chief Complaint     Chief Complaint   Patient presents with    Cystoscopy    Urinary Retention         History of Present Illness     Gio Martines is a 78 y o  gentleman with a history of urinary retention, also overflow incontinence, and history of penile prosthesis placement, patient is still using his device  He was seen by my colleague, Dr Greg Benitez, at the end of July and started on tamsulosin  Per the notes that are available to assist Fisher catheter was removed at the end of July  Since that time he states that he has been urinating well  New urologic complaints include none  The following portions of the patient's history were reviewed and updated as appropriate: allergies, current medications, past family history, past medical history, past social history, past surgical history and problem list     Detailed Urologic History     - please refer to HPI    Review of Systems     Review of Systems   Constitutional: Negative  HENT: Negative  Eyes: Negative  Respiratory: Negative  Cardiovascular: Negative  Gastrointestinal: Negative  Endocrine: Negative  Genitourinary:        As per HPI   Musculoskeletal: Negative  Skin: Negative  Allergic/Immunologic: Negative  Neurological: Negative  Hematological: Negative  Psychiatric/Behavioral: Negative  Allergies     No Known Allergies    Physical Exam     Physical Exam  Vitals signs and nursing note reviewed  Constitutional:       General: He is not in acute distress  Appearance: He is well-developed  He is not ill-appearing, toxic-appearing or diaphoretic  HENT:      Head: Normocephalic and atraumatic  Eyes:      General: No scleral icterus  Right eye: No discharge  Left eye: No discharge  Neck:      Musculoskeletal: Neck supple  Thyroid: No thyromegaly  Trachea: No tracheal deviation  Cardiovascular:      Pulses: Normal pulses  Pulmonary:      Effort: Pulmonary effort is normal  No respiratory distress  Breath sounds: No stridor  Abdominal:      General: There is no distension  Palpations: Abdomen is soft  There is no mass  Tenderness: There is no abdominal tenderness  There is no guarding or rebound  Hernia: No hernia is present  Musculoskeletal:         General: No swelling, tenderness or deformity  Skin:     General: Skin is warm and dry  Coloration: Skin is not pale  Findings: No erythema or rash  Neurological:      Mental Status: He is alert and oriented to person, place, and time  Cranial Nerves: No cranial nerve deficit  Sensory: No sensory deficit  Motor: No abnormal muscle tone  Coordination: Coordination normal    Psychiatric:         Behavior: Behavior normal          Thought Content:  Thought content normal          Judgment: Judgment normal              Vital Signs  Vitals:    09/25/20 1100   BP: 136/80   BP Location: Left arm   Patient Position: Sitting   Cuff Size: Standard   Pulse: 56   Temp: 98 1 °F (36 7 °C)   Weight: 99 9 kg (220 lb 3 2 oz)   Height: 5' 7" (1 702 m)         Current Medications       Current Outpatient Medications:     amLODIPine (NORVASC) 10 mg tablet, Take 1 tablet (10 mg total) by mouth daily, Disp: 90 tablet, Rfl: 1    calcitriol (ROCALTROL) 0 25 mcg capsule, Take 0 25 mcg by mouth daily, Disp: , Rfl:     calcium acetate (PHOSLO) capsule, TAKE 1 CAPSULE (667 MG TOTAL) BY MOUTH 3 (THREE) TIMES A DAY WITH MEALS, Disp: 270 capsule, Rfl: 0    Fluad Quadrivalent 0 5 ML PRSY, inject 0 5 milliliters intramuscularly, Disp: , Rfl:     furosemide (LASIX) 20 mg tablet, TAKE 1 TABLET EVERY DAY, Disp: 90 tablet, Rfl: 1    insulin aspart (NovoLOG) 100 units/mL injection, Inject 20 Units under the skin 3 (three) times a day before meals, Disp: , Rfl:     insulin glargine (LANTUS) 100 units/mL subcutaneous injection, Inject 40 Units under the skin daily at bedtime, Disp: , Rfl:     varenicline (CHANTIX) 1 mg tablet, Take 1 mg by mouth 2 (two) times a day, Disp: , Rfl:     finasteride (PROSCAR) 5 mg tablet, Take 1 tablet (5 mg total) by mouth daily, Disp: 90 tablet, Rfl: 3    tamsulosin (FLOMAX) 0 4 mg, Take 1 capsule (0 4 mg total) by mouth daily with dinner, Disp: 90 capsule, Rfl: 3      Active Problems     Patient Active Problem List   Diagnosis    Other hyperlipidemia    Type 2 diabetes mellitus with hyperglycemia ([place] [address] )    Essential hypertension    ESRD (end stage renal disease) on dialysis [facility])    Urinary retention         Past Medical History     Past Medical History:   Diagnosis Date    Cancer ([facility] )     kidney     CHF (congestive heart failure) (HCC)     Diabetes mellitus ([place] [address] )     Hyperlipidemia     Hypertension     Renal disorder          Surgical History     Past Surgical History:   Procedure Laterality Date    IR TUNNELED DIALYSIS CATHETER PLACEMENT  1/31/2019    NEPHRECTOMY Right     PLACEMENT PROTHESIS PENILE           Family History     History reviewed  No pertinent family history        Social History     Social History     Social History     Tobacco Use   Smoking Status Former Smoker   Smokeless Tobacco Never Used         Pertinent Lab Values     Lab Results   Component Value Date    CREATININE 5 37 (H) 02/07/2019   Patient is on dialysis    No results found for: PSA          Pertinent Imaging      No recent imaging for my review

## 2020-09-22 NOTE — PATIENT INSTRUCTIONS

## 2020-09-25 ENCOUNTER — PROCEDURE VISIT (OUTPATIENT)
Dept: UROLOGY | Facility: CLINIC | Age: 70
End: 2020-09-25
Payer: COMMERCIAL

## 2020-09-25 VITALS
TEMPERATURE: 98.1 F | WEIGHT: 220.2 LBS | HEART RATE: 56 BPM | HEIGHT: 67 IN | DIASTOLIC BLOOD PRESSURE: 80 MMHG | BODY MASS INDEX: 34.56 KG/M2 | SYSTOLIC BLOOD PRESSURE: 136 MMHG

## 2020-09-25 DIAGNOSIS — N13.8 BPH WITH OBSTRUCTION/LOWER URINARY TRACT SYMPTOMS: ICD-10-CM

## 2020-09-25 DIAGNOSIS — N40.1 BPH WITH OBSTRUCTION/LOWER URINARY TRACT SYMPTOMS: ICD-10-CM

## 2020-09-25 DIAGNOSIS — N18.6 ESRD (END STAGE RENAL DISEASE) ON DIALYSIS (HCC): ICD-10-CM

## 2020-09-25 DIAGNOSIS — R33.9 URINARY RETENTION: Primary | ICD-10-CM

## 2020-09-25 DIAGNOSIS — Z99.2 ESRD (END STAGE RENAL DISEASE) ON DIALYSIS (HCC): ICD-10-CM

## 2020-09-25 LAB — POST-VOID RESIDUAL VOLUME, ML POC: 100 ML

## 2020-09-25 PROCEDURE — 51741 ELECTRO-UROFLOWMETRY FIRST: CPT | Performed by: UROLOGY

## 2020-09-25 PROCEDURE — 51798 US URINE CAPACITY MEASURE: CPT | Performed by: UROLOGY

## 2020-09-25 RX ORDER — TAMSULOSIN HYDROCHLORIDE 0.4 MG/1
0.4 CAPSULE ORAL
Qty: 90 CAPSULE | Refills: 3 | Status: SHIPPED | OUTPATIENT
Start: 2020-09-25 | End: 2021-05-13 | Stop reason: SDUPTHER

## 2020-09-25 RX ORDER — A/SINGAPORE/GP1908/2015 IVR-180 (AN A/MICHIGAN/45/2015 (H1N1)PDM09-LIKE VIRUS, A/HONG KONG/4801/2014, NYMC X-263B (H3N2) (AN A/HONG KONG/4801/2014-LIKE VIRUS), AND B/BRISBANE/60/2008, WILD TYPE (A B/BRISBANE/60/2008-LIKE VIRUS) 15; 15; 15 UG/.5ML; UG/.5ML; UG/.5ML
INJECTION, SUSPENSION INTRAMUSCULAR
COMMUNITY
Start: 2020-08-18

## 2020-09-25 RX ORDER — FINASTERIDE 5 MG/1
5 TABLET, FILM COATED ORAL DAILY
Qty: 90 TABLET | Refills: 3 | Status: SHIPPED | OUTPATIENT
Start: 2020-09-25 | End: 2021-05-13 | Stop reason: SDUPTHER

## 2020-09-25 NOTE — LETTER
September 25, 2020     Marco Burns PA-C  [address]   [address] [facility] [facility]    Patient: Dotty Barnard   YOB: 1950   Date of Visit: 9/25/2020       Dear Dr Blanca Kaur: Thank you for referring Dotty Barnard to me for evaluation  Below are my notes for this consultation  If you have questions, please do not hesitate to call me  I look forward to following your patient along with you  Sincerely,        Delisa Garcia MD        CC: No Recipients  Delisa Garcia MD  9/25/2020 11:19 AM  Sign when Signing Visit       Problem List Items Addressed This Visit        Genitourinary    ESRD (end stage renal disease) on dialysis [facility])    Relevant Orders    POCT Measure PVR (Completed)    Urinary retention - Primary    Relevant Medications    tamsulosin (FLOMAX) 0 4 mg    finasteride (PROSCAR) 5 mg tablet    Other Relevant Orders    POCT Measure PVR (Completed)      Other Visit Diagnoses     BPH with obstruction/lower urinary tract symptoms        Relevant Medications    tamsulosin (FLOMAX) 0 4 mg    finasteride (PROSCAR) 5 mg tablet            Discussion:  I had a very nice visit with Anselmo today  He is emptying appropriately with a postvoid residual urine volume of 100 milliliters  He only voided 43 milliliters with a peak flow of 6 milliliters/second, likely due to a low voided volume  Importantly, he was originally scheduled for cystoscopy and transrectal ultrasound, however he does not want this done, and given the fact that he is urinating I think that the best treatment for him is to have him on dual medical therapy at this time  He will see us back in 6 months, for a PVR determination, if doing well at that time he can stay on dual medical therapy and follow on a yearly basis  He did inquire as to removing his penile prosthesis, as he is concerned that as he gets older he will not be able to have this removed as he gets older and sicker    As the implant is still working, and it is not infected, I would not recommend any surgical intervention at this time  All questions and concerns answered and addressed      Assessment and plan:     Please see problem oriented charting for the assessment plan of today's urological complaints      Baljinder Charles MD      Chief Complaint     Chief Complaint   Patient presents with    Cystoscopy    Urinary Retention         History of Present Illness     Shanelle Abebe is a 78 y o  gentleman with a history of urinary retention, also overflow incontinence, and history of penile prosthesis placement, patient is still using his device  He was seen by my colleague, Dr Brooklynn Kaplan, at the end of July and started on tamsulosin  Per the notes that are available to assist Fisher catheter was removed at the end of July  Since that time he states that he has been urinating well  New urologic complaints include none  The following portions of the patient's history were reviewed and updated as appropriate: allergies, current medications, past family history, past medical history, past social history, past surgical history and problem list     Detailed Urologic History     - please refer to HPI    Review of Systems     Review of Systems   Constitutional: Negative  HENT: Negative  Eyes: Negative  Respiratory: Negative  Cardiovascular: Negative  Gastrointestinal: Negative  Endocrine: Negative  Genitourinary:        As per HPI   Musculoskeletal: Negative  Skin: Negative  Allergic/Immunologic: Negative  Neurological: Negative  Hematological: Negative  Psychiatric/Behavioral: Negative  Allergies     No Known Allergies    Physical Exam     Physical Exam  Vitals signs and nursing note reviewed  Constitutional:       General: He is not in acute distress  Appearance: He is well-developed  He is not ill-appearing, toxic-appearing or diaphoretic  HENT:      Head: Normocephalic and atraumatic     Eyes: General: No scleral icterus  Right eye: No discharge  Left eye: No discharge  Neck:      Musculoskeletal: Neck supple  Thyroid: No thyromegaly  Trachea: No tracheal deviation  Cardiovascular:      Pulses: Normal pulses  Pulmonary:      Effort: Pulmonary effort is normal  No respiratory distress  Breath sounds: No stridor  Abdominal:      General: There is no distension  Palpations: Abdomen is soft  There is no mass  Tenderness: There is no abdominal tenderness  There is no guarding or rebound  Hernia: No hernia is present  Musculoskeletal:         General: No swelling, tenderness or deformity  Skin:     General: Skin is warm and dry  Coloration: Skin is not pale  Findings: No erythema or rash  Neurological:      Mental Status: He is alert and oriented to person, place, and time  Cranial Nerves: No cranial nerve deficit  Sensory: No sensory deficit  Motor: No abnormal muscle tone  Coordination: Coordination normal    Psychiatric:         Behavior: Behavior normal          Thought Content:  Thought content normal          Judgment: Judgment normal              Vital Signs  Vitals:    09/25/20 1100   BP: 136/80   BP Location: Left arm   Patient Position: Sitting   Cuff Size: Standard   Pulse: 56   Temp: 98 1 °F (36 7 °C)   Weight: 99 9 kg (220 lb 3 2 oz)   Height: 5' 7" (1 702 m)         Current Medications       Current Outpatient Medications:     amLODIPine (NORVASC) 10 mg tablet, Take 1 tablet (10 mg total) by mouth daily, Disp: 90 tablet, Rfl: 1    calcitriol (ROCALTROL) 0 25 mcg capsule, Take 0 25 mcg by mouth daily, Disp: , Rfl:     calcium acetate (PHOSLO) capsule, TAKE 1 CAPSULE (667 MG TOTAL) BY MOUTH 3 (THREE) TIMES A DAY WITH MEALS, Disp: 270 capsule, Rfl: 0    Fluad Quadrivalent 0 5 ML PRSY, inject 0 5 milliliters intramuscularly, Disp: , Rfl:     furosemide (LASIX) 20 mg tablet, TAKE 1 TABLET EVERY DAY, Disp: 90 tablet, Rfl: 1    insulin aspart (NovoLOG) 100 units/mL injection, Inject 20 Units under the skin 3 (three) times a day before meals, Disp: , Rfl:     insulin glargine (LANTUS) 100 units/mL subcutaneous injection, Inject 40 Units under the skin daily at bedtime, Disp: , Rfl:     varenicline (CHANTIX) 1 mg tablet, Take 1 mg by mouth 2 (two) times a day, Disp: , Rfl:     finasteride (PROSCAR) 5 mg tablet, Take 1 tablet (5 mg total) by mouth daily, Disp: 90 tablet, Rfl: 3    tamsulosin (FLOMAX) 0 4 mg, Take 1 capsule (0 4 mg total) by mouth daily with dinner, Disp: 90 capsule, Rfl: 3      Active Problems     Patient Active Problem List   Diagnosis    Other hyperlipidemia    Type 2 diabetes mellitus with hyperglycemia (HCC)    Essential hypertension    ESRD (end stage renal disease) on dialysis [facility])    Urinary retention         Past Medical History     Past Medical History:   Diagnosis Date    Cancer ([facility] [address] )     kidney     CHF (congestive heart failure) ([facility] [address] )     Diabetes mellitus ([facility] [address] )     Hyperlipidemia     Hypertension     Renal disorder          Surgical History     Past Surgical History:   Procedure Laterality Date    IR TUNNELED DIALYSIS CATHETER PLACEMENT  1/31/2019    NEPHRECTOMY Right     PLACEMENT PROTHESIS PENILE           Family History     History reviewed  No pertinent family history        Social History     Social History     Social History     Tobacco Use   Smoking Status Former Smoker   Smokeless Tobacco Never Used         Pertinent Lab Values     Lab Results   Component Value Date    CREATININE 5 37 (H) 02/07/2019   Patient is on dialysis    No results found for: PSA          Pertinent Imaging      No recent imaging for my review

## 2020-10-05 RX ORDER — TAMSULOSIN HYDROCHLORIDE 0.4 MG/1
CAPSULE ORAL
Qty: 90 CAPSULE | Refills: 1 | Status: SHIPPED | OUTPATIENT
Start: 2020-10-05 | End: 2021-05-13 | Stop reason: SDUPTHER

## 2021-01-29 DIAGNOSIS — I10 ESSENTIAL HYPERTENSION: ICD-10-CM

## 2021-01-30 RX ORDER — FUROSEMIDE 20 MG/1
TABLET ORAL
Qty: 90 TABLET | Refills: 1 | Status: SHIPPED | OUTPATIENT
Start: 2021-01-30

## 2021-03-09 ENCOUNTER — TRANSCRIBE ORDERS (OUTPATIENT)
Dept: RADIOLOGY | Facility: HOSPITAL | Age: 71
End: 2021-03-09

## 2021-03-09 ENCOUNTER — PREP FOR PROCEDURE (OUTPATIENT)
Dept: INTERVENTIONAL RADIOLOGY/VASCULAR | Facility: CLINIC | Age: 71
End: 2021-03-09

## 2021-03-09 DIAGNOSIS — N18.6 ESRD (END STAGE RENAL DISEASE) (HCC): Primary | ICD-10-CM

## 2021-03-09 DIAGNOSIS — N18.6 ESRD (END STAGE RENAL DISEASE) ON DIALYSIS (HCC): Primary | ICD-10-CM

## 2021-03-09 DIAGNOSIS — Z99.2 ESRD (END STAGE RENAL DISEASE) ON DIALYSIS (HCC): Primary | ICD-10-CM

## 2021-03-22 NOTE — PROGRESS NOTES
7/28/2020    Nelson Justin  1950  [id number]    Diagnosis  Chief Complaint     Urinary Retention          Patient presents for void trial managed by Dr Elayne Cervantes  Follow up as scheduled for cysto   Patient to call the office with any questions or concerns prior     Procedure Fisher removal/voiding trial    Fisher catheter removed after deflation of an intact balloon  Patient tolerated well  Encouraged patient to hydrate well and return this afternoon for post void residual   he knows he may return early if uncomfortable and unable to urinate  Patient agrees to this plan  Patient returned this afternoon  Patient states able to void  Patient voided 50 ml while in office  Bladder ultrasound performed and PVR measured 91ml  Patient was only able to urinate once today in the office for 50ml PVR was 91ml  Patient reports that he only drank 2 bottles of water today  Discussed with Frieda Cabrera PA-C who reports that although we are not fully convinced that he will continue to urinate catheter does not need to be replaced if he does now want it  Spoke with patient who reports that catheter was painful and he does not want it replaced  Reviewed ER precautions with patient  Patient is aware to call the office if any changes           Recent Results (from the past 4 hour(s))   POCT Measure PVR    Collection Time: 07/28/20  2:51 PM   Result Value Ref Range    POST-VOID RESIDUAL VOLUME, ML POC 91 mL           Vitals:    07/28/20 0918   BP: 136/78   BP Location: Right arm   Patient Position: Sitting   Cuff Size: Standard   Pulse: 82   Temp: 98 4 °F (36 9 °C)   Height: 5' 7" (1 702 m)           Janneth Evans RN Medication:   Requested Prescriptions     Pending Prescriptions Disp Refills    chlorthalidone (HYGROTEN) 50 MG tablet [Pharmacy Med Name: CHLORTHALIDONE 50 MG TABLET] 90 tablet 3     Sig: TAKE ONE TABLET BY MOUTH DAILY        Last Filled:      Patient Phone Number: [phone] (home)     Last appt: 6/6/2019   Next appt: Visit date not found    Last OARRS: No flowsheet data found.

## 2021-04-16 ENCOUNTER — PREP FOR PROCEDURE (OUTPATIENT)
Dept: INTERVENTIONAL RADIOLOGY/VASCULAR | Facility: CLINIC | Age: 71
End: 2021-04-16

## 2021-04-16 ENCOUNTER — TRANSCRIBE ORDERS (OUTPATIENT)
Dept: INTERVENTIONAL RADIOLOGY/VASCULAR | Facility: HOSPITAL | Age: 71
End: 2021-04-16

## 2021-04-16 DIAGNOSIS — T82.590A DIALYSIS AV FISTULA MALFUNCTION, INITIAL ENCOUNTER (HCC): Primary | ICD-10-CM

## 2021-04-16 DIAGNOSIS — Z99.2 ESRD (END STAGE RENAL DISEASE) ON DIALYSIS (HCC): Primary | ICD-10-CM

## 2021-04-16 DIAGNOSIS — N18.6 ESRD (END STAGE RENAL DISEASE) ON DIALYSIS (HCC): Primary | ICD-10-CM

## 2021-05-13 ENCOUNTER — TELEPHONE (OUTPATIENT)
Dept: UROLOGY | Facility: CLINIC | Age: 71
End: 2021-05-13

## 2021-05-13 DIAGNOSIS — R33.9 URINARY RETENTION: ICD-10-CM

## 2021-05-13 DIAGNOSIS — N13.8 BPH WITH OBSTRUCTION/LOWER URINARY TRACT SYMPTOMS: ICD-10-CM

## 2021-05-13 DIAGNOSIS — N40.1 BPH WITH OBSTRUCTION/LOWER URINARY TRACT SYMPTOMS: ICD-10-CM

## 2021-05-13 RX ORDER — TAMSULOSIN HYDROCHLORIDE 0.4 MG/1
0.4 CAPSULE ORAL
Qty: 90 CAPSULE | Refills: 1 | Status: SHIPPED | OUTPATIENT
Start: 2021-05-13

## 2021-05-13 RX ORDER — FINASTERIDE 5 MG/1
5 TABLET, FILM COATED ORAL DAILY
Qty: 90 TABLET | Refills: 1 | Status: SHIPPED | OUTPATIENT
Start: 2021-05-13

## 2021-05-13 NOTE — TELEPHONE ENCOUNTER
Medication Refill (Tamsulosin 0 4mg and Finasteride 5mg)    [facility] Provider; [facility] [address] [place] [address] 2 minutes ago (8:32 AM)     [facility] - Please schedule this patient for an office visit in September, 2021  His last visit, provider and location are mentioned in My Note  Thanks - V    Routing comment       Stephie Hairston MA 6 minutes ago (8:27 AM)        An Auto-fax Refill Request for Tamsulosin 0 4mg and Finasteride 5mg was received from [facility]        The patient was last seen on 9/25/20 by Dr Adriana Ramirez in the [facility] location; continuation of the medication was authorized at that time  Request for same, 90 day supply with 1 refill was queued and forwarded to the Advanced Practitioner covering the [facility] location for approval      I also noted that an annual recall office visit was not yet scheduled  Message will be forwarded to [facility] so they can schedule an office visit accordingly

## 2021-05-13 NOTE — TELEPHONE ENCOUNTER
An Auto-fax Refill Request for Tamsulosin 0 4mg and Finasteride 5mg was received from [facility] mail order [place]  The patient was last seen on 9/25/20 by Dr Ashok Burgess in the [facility] location; continuation of the medication was authorized at that time  Request for same, 90 day supply with 1 refill was queued and forwarded to the Advanced Practitioner covering the [facility] location for approval     I also noted that an annual recall office visit was not yet scheduled  Message will be forwarded to [facility] so they can schedule an office visit accordingly

## 2021-09-13 ENCOUNTER — TELEPHONE (OUTPATIENT)
Dept: UROLOGY | Facility: CLINIC | Age: 71
End: 2021-09-13

## 2021-09-13 NOTE — TELEPHONE ENCOUNTER
Attempted to reach patient but the person is not accepting calls at this time  Will need to get rescheduled with an AP if he returns our call       Thanks